# Patient Record
Sex: FEMALE | Race: WHITE | Employment: FULL TIME | URBAN - METROPOLITAN AREA
[De-identification: names, ages, dates, MRNs, and addresses within clinical notes are randomized per-mention and may not be internally consistent; named-entity substitution may affect disease eponyms.]

---

## 2017-01-17 ENCOUNTER — RADIANT APPOINTMENT (OUTPATIENT)
Dept: MAMMOGRAPHY | Facility: CLINIC | Age: 42
End: 2017-01-17
Attending: OBSTETRICS & GYNECOLOGY
Payer: COMMERCIAL

## 2017-01-17 DIAGNOSIS — Z12.31 VISIT FOR SCREENING MAMMOGRAM: ICD-10-CM

## 2017-01-17 PROCEDURE — G0202 SCR MAMMO BI INCL CAD: HCPCS | Mod: TC

## 2017-01-23 ENCOUNTER — OFFICE VISIT (OUTPATIENT)
Dept: FAMILY MEDICINE | Facility: CLINIC | Age: 42
End: 2017-01-23
Payer: COMMERCIAL

## 2017-01-23 VITALS
SYSTOLIC BLOOD PRESSURE: 118 MMHG | HEART RATE: 76 BPM | WEIGHT: 152 LBS | DIASTOLIC BLOOD PRESSURE: 66 MMHG | HEIGHT: 67 IN | TEMPERATURE: 98.2 F | BODY MASS INDEX: 23.86 KG/M2

## 2017-01-23 DIAGNOSIS — M99.04 SOMATIC DYSFUNCTION OF SACRAL REGION: ICD-10-CM

## 2017-01-23 DIAGNOSIS — M54.50 ACUTE LEFT-SIDED LOW BACK PAIN WITHOUT SCIATICA: Primary | ICD-10-CM

## 2017-01-23 DIAGNOSIS — M99.02 SOMATIC DYSFUNCTION OF THORACIC REGION: ICD-10-CM

## 2017-01-23 DIAGNOSIS — M99.03 SOMATIC DYSFUNCTION OF LUMBAR REGION: ICD-10-CM

## 2017-01-23 DIAGNOSIS — M99.05 SOMATIC DYSFUNCTION OF PELVIS REGION: ICD-10-CM

## 2017-01-23 PROCEDURE — 99213 OFFICE O/P EST LOW 20 MIN: CPT | Mod: 25 | Performed by: FAMILY MEDICINE

## 2017-01-23 PROCEDURE — 98926 OSTEOPATH MANJ 3-4 REGIONS: CPT | Performed by: FAMILY MEDICINE

## 2017-01-23 RX ORDER — CYCLOBENZAPRINE HCL 10 MG
5-10 TABLET ORAL 3 TIMES DAILY PRN
Qty: 30 TABLET | Refills: 1 | Status: SHIPPED | OUTPATIENT
Start: 2017-01-23 | End: 2022-12-05

## 2017-01-23 NOTE — PATIENT INSTRUCTIONS
Do back exercises.   Take Flexeril every night for 3-4 nights for back pain.   Follow up in 3-4 weeks for manipulation.  Find some exercise you like to do.

## 2017-01-23 NOTE — PROGRESS NOTES
"SUBJECTIVE:  Sonya Blackmon, a 41 year old female scheduled an appointment to discuss the following issues:    Lower Back Pain  Patient reports that her intermittent lower back pain turned into constant lower back pain within the last year. She is seated at work for 8 hours a day. She has not tried any muscle relaxers.     Medical, social, surgical, and family histories reviewed.    ROS:  GI: NEGATIVE for nausea, abdominal pain, heartburn, or change in bowel habits  : NEGATIVE for frequency, dysuria, or hematuria  M: NEGATIVE for significant arthralgias or myalgia  N: NEGATIVE for weakness, dizziness or paresthesias or headache  P: NEGATIVE for changes in mood or affect    This document serves as a record of the services and decisions personally performed and made by Becca Pardo DO. It was created on her/his behalf by Rupal Palomino, a trained medical scribe. The creation of this document is based the provider's statements to the medical scribe.  Rupal Palomino January 23, 2017 7:47 AM    OBJECTIVE:  /66 mmHg  Pulse 76  Temp(Src) 98.2  F (36.8  C) (Oral)  Ht 5' 6.75\" (1.695 m)  Wt 152 lb (68.947 kg)  BMI 24.00 kg/m2  LMP 01/15/2017 (Approximate)  EXAM:  GENERAL APPEARANCE: healthy, alert and no distress  MS: Paraspinal lumbar spasm on the right, pain over the lumbar-sacro junction on the left, extremities normal- no gross deformities noted, no evidence of inflammation in joints, FROM in all extremities  NEURO: Normal strength and tone, sensory exam grossly normal, mentation intact and speech normal  PSYCH: mentation appears normal and affect normal/bright    Osteopathic Structural Exam    Thoracis-- T3-T4 rotated left, T5,6,7,8 all rotated right,  Treatment HVLA, muscle energy  Lumbars--L1-2 rotated right, Treatment, ME  Pelvix-- upslip on the left and right,  Treatment muscle energy  Sacrum--unilateral forward torsion on left, treatment ME    ASSESSMENT/PLAN:    ICD-10-CM    1. Acute left-sided " low back pain without sciatica M54.5 cyclobenzaprine (FLEXERIL) 10 MG tablet   2. Somatic dysfunction of thoracic region M99.02 OSTEOPATHIC MANIP,3-4 BODY REGN   3. Somatic dysfunction of lumbar region M99.03 OSTEOPATHIC MANIP,3-4 BODY REGN   4. Somatic dysfunction of sacral region M99.04 OSTEOPATHIC MANIP,3-4 BODY REGN   5. Somatic dysfunction of pelvis region M99.05 OSTEOPATHIC MANIP,3-4 BODY REGN        Patient Instructions   Do back exercises.   Take Flexeril every night for 3-4 nights for back pain.   Follow up in 3-4 weeks for manipulation.  Find some exercise you like to do.       This document serves as a record of the services and decisions personally performed and made by Becca Pardo DO. It was created on her behalf by Rupal Palomino, a trained medical scribe. The creation of this document is based the provider's statements to the medical scribe.  Rupal Palomino January 23, 2017 7:47 AM

## 2017-01-23 NOTE — NURSING NOTE
"Chief Complaint   Patient presents with     Back Pain       Initial /66 mmHg  Pulse 76  Temp(Src) 98.2  F (36.8  C) (Oral)  Ht 5' 6.75\" (1.695 m)  Wt 152 lb (68.947 kg)  BMI 24.00 kg/m2  LMP 01/15/2017 (Approximate) Estimated body mass index is 24 kg/(m^2) as calculated from the following:    Height as of this encounter: 5' 6.75\" (1.695 m).    Weight as of this encounter: 152 lb (68.947 kg).  BP completed using cuff size: regular    Siena Ruby MA     "

## 2017-01-23 NOTE — PROGRESS NOTES
"  SUBJECTIVE:                                                    Sonya Blackmon is a 41 year old female who presents to clinic today for the following health issues:    Back Pain      Duration: about one year        Specific cause: none    Description:   Location of pain: low back left  Character of pain: sharp and dull ache  Pain radiation:radiates into the left leg  New numbness or weakness in legs, not attributed to pain:  YES    Intensity: Currently 5/10    History:   Pain interferes with job: YES  History of back problems: no prior back problems  Any previous MRI or X-rays: None  Sees a specialist for back pain:  No  Therapies tried without relief: nothing    Alleviating factors:   Improved by: ibuprofen and tylenol help temporarily      Precipitating factors:  Worsened by: Sitting, yardwork     Accompanying Signs & Symptoms:  Risk of Fracture:  None  Risk of Cauda Equina:  None  Risk of Infection:  None  Risk of Cancer:  None  Risk of Ankylosing Spondylitis:  Onset at age <35, male, AND morning back stiffness. no          Came on fast   Now more constant. Not as painful but constant. Hard to get up.  Tried chiropractic once. No physical therapy.     Tingling down back of left side. Sometimes down to toes usually above the knee.    {additional problems for provider to add:490073}    Problem list and histories reviewed & adjusted, as indicated.  Additional history: {NONE - AS DOCUMENTED:549807::\"as documented\"}    {HIST REVIEW/ LINKS 2:343060}    ROS:  {ROS COMP:153711}    OBJECTIVE:                                                    /66 mmHg  Pulse 76  Temp(Src) 98.2  F (36.8  C) (Oral)  Ht 5' 6.75\" (1.695 m)  Wt 152 lb (68.947 kg)  BMI 24.00 kg/m2  LMP 01/15/2017 (Approximate)  Body mass index is 24 kg/(m^2).  {Exam List:336026}    {Diagnostic Test Results:885808::\"Diagnostic Test Results:\",\"none \"}     ASSESSMENT/PLAN:                                                    {Associate for " Codin}    {Quality Requirements:366089}    {Diag Picklist:152474}    {Follow-Up:391906}    Becca Pardo DO  [facility]

## 2017-01-23 NOTE — MR AVS SNAPSHOT
After Visit Summary   1/23/2017    Sonya Blackmon    MRN: [id number]           Patient Information     Date Of Birth          1975        Visit Information        Provider Department      1/23/2017 7:20 AM Becca Pardo,  [facility]        Today's Diagnoses     Acute left-sided low back pain without sciatica    -  1     Somatic dysfunction of thoracic region         Somatic dysfunction of lumbar region         Somatic dysfunction of sacral region         Somatic dysfunction of pelvis region           Care Instructions    Do back exercises.   Take Flexeril every night for 3-4 nights for back pain.   Follow up in 3-4 weeks for manipulation.  Find some exercise you like to do.         Follow-ups after your visit        Your next 10 appointments already scheduled     Feb 10, 2017  8:30 AM   New Visit with Caitlyn Moon MD   [facility] ([facility]    [address] [place] [address] [phone]              Who to contact     If you have questions or need follow up information about today's clinic visit or your schedule please contact [facility] directly at [phone].  Normal or non-critical lab and imaging results will be communicated to you by [employer], letter or phone within 4 business days after the clinic has received the results. If you do not hear from us within 7 days, please contact the clinic through [employer] or phone. If you have a critical or abnormal lab result, we will notify you by phone as soon as possible.  Submit refill requests through [employer] or call your pharmacy and they will forward the refill request to us. Please allow 3 business days for your refill to be completed.          Additional Information About Your Visit        [employer] Information     [employer] lets you send messages to your doctor, view your test results, renew your prescriptions, schedule appointments and more. To  "sign up, go to [email or web address] . Click on \"Log in\" on the left side of the screen, which will take you to the Welcome page. Then click on \"Sign up Now\" on the right side of the page.     You will be asked to enter the access code listed below, as well as some personal information. Please follow the directions to create your username and password.     Your access code is: [email or web address]  Expires: 2017  8:07 AM     Your access code will  in 90 days. If you need help or a new code, please call your [place] clinic or [phone].        Care EveryWhere ID     This is your Care EveryWhere ID. This could be used by other organizations to access your [facility] medical records  [id number]        Your Vitals Were     Pulse Temperature Height BMI (Body Mass Index) Last Period       76 98.2  F (36.8  C) (Oral) 5' 6.75\" (1.695 m) 24.00 kg/m2 01/15/2017 (Approximate)        Blood Pressure from Last 3 Encounters:   17 118/66   16 104/64   01/27/15 104/52    Weight from Last 3 Encounters:   17 152 lb (68.947 kg)   16 150 lb 9.6 oz (68.312 kg)   01/27/15 152 lb 9.6 oz (69.219 kg)              We Performed the Following     OSTEOPATHIC MANIP,3-4 BODY REGN          Today's Medication Changes          These changes are accurate as of: 17  8:07 AM.  If you have any questions, ask your nurse or doctor.               Start taking these medicines.        Dose/Directions    cyclobenzaprine 10 MG tablet   Commonly known as:  FLEXERIL   Used for:  Acute left-sided low back pain without sciatica   Started by:  Becca Pardo DO        Dose:  5-10 mg   Take 0.5-1 tablets (5-10 mg) by mouth 3 times daily as needed for muscle spasms   Quantity:  30 tablet   Refills:  1            Where to get your medicines      These medications were sent to [place] [address] [place] [facility] [place] - [address]  [address], [facility] [address]     Phone:  [phone]    - cyclobenzaprine 10 MG " tablet             Primary Care Provider Office Phone # Fax #    [facility] [phone]       [address] [place] [id number]        Thank you!     Thank you for choosing [facility]  for your care. Our goal is always to provide you with excellent care. Hearing back from our patients is one way we can continue to improve our services. Please take a few minutes to complete the written survey that you may receive in the mail after your visit with us. Thank you!             Your Updated Medication List - Protect others around you: Learn how to safely use, store and throw away your medicines at www.disposemymeds.org.          This list is accurate as of: 1/23/17  8:07 AM.  Always use your most recent med list.                   Brand Name Dispense Instructions for use    ACZONE 7.5 % gel   Generic drug:  dapsone      Apply topically daily       adapalene 0.3 % gel      Apply topically At Bedtime       cyclobenzaprine 10 MG tablet    FLEXERIL    30 tablet    Take 0.5-1 tablets (5-10 mg) by mouth 3 times daily as needed for muscle spasms       DAILY MULTIVITAMIN PO      Take  by mouth.       DOXYCYCLINE HYCLATE PO      Take 50 mg by mouth 2 times daily       Sulfacetamide Sodium 10 % Liqd

## 2017-01-23 NOTE — PROGRESS NOTES
"Sonya Blackmon is a 41 year old female who presents to clinic today for the following health issues:   Back Pain    Duration: about one year  Specific cause: none. No inciting trauma  Description:   Location of pain: low back left   Character of pain: sharp and dull ache   Pain radiation:radiates into the left leg   New numbness or weakness in legs, not attributed to pain: YES   Intensity: Currently 5/10   History:   Pain interferes with job: YES   History of back problems: no prior back problems  Any previous MRI or X-rays: None   Sees a specialist for back pain: No   Therapies tried without relief: nothing   Alleviating factors:   Improved by: ibuprofen and tylenol help temporarily   Precipitating factors:  Worsened by: Sitting, yardwork  Accompanying Signs & Symptoms:  Risk of Fracture: None   Risk of Cauda Equina: None   Risk of Infection: None   Risk of Cancer: None   Risk of Ankylosing Spondylitis: Onset at age <35, male, AND morning back stiffness. no      She has been having intermittent back pain for the past year. At onset, pain episodes would come on suddenly and last for ~5 days. Now the pain is more constant and nagging. Pain is mostly exacerbated by sitting for 8 hours per day at work. Associated with paresthesias that radiate down back of left leg usually to the level of the knee but occasionally to her toes. Has been using OTC pain medication and heat at home with inconsistent relief. She has not tried muscle relaxers before. Has seen chiropractor once but no physical therapy. Denies fever, night sweats, weight loss, urinary retention, and incontinence. No history of long term corticosteroid use or fractures.  Problem list and histories reviewed & adjusted, as indicated.   ROS:   Constitutional, HEENT, cardiovascular, pulmonary, gi and gu systems are negative, except as otherwise noted.   OBJECTIVE:     /66 mmHg  Pulse 76  Temp(Src) 98.2  F (36.8  C) (Oral)  Ht 5' 6.75\" (1.695 m)  " Wt 152 lb (68.947 kg)  BMI 24.00 kg/m2  LMP 01/15/2017 (Approximate)  Body mass index is 24 kg/(m^2).   GENERAL: healthy, alert and no distress  RESP: lungs clear to auscultation - no rales, rhonchi or wheezes  CV: regular rate and rhythm, normal S1 S2, no S3 or S4, no murmur, click or rub, no peripheral edema and peripheral pulses strong  MS: FROM in all extremities. No gross deformities noted, no evidence of inflammation in joints. Flexion, extension and lateral rotation intact without pain. Mild tenderness over left L5-S1. No paraspinous muscle tenderness to palpation. Able to ambulate without pain. Negative straight leg raise test.  NEURO: normal strength and tone. Sensory exam grossly normal. Mentation intact. Normal speech. 2+ patellar and achilles reflexes bilaterally.  PSYCH: affect normal/bright  SKIN: no concerning rashes or lesions.  Diagnostic Test Results:  none       ASSESSMENT/PLAN:         ICD-10-CM    1. Acute left-sided low back pain without sciatica M54.5 cyclobenzaprine (FLEXERIL) 10 MG tablet   2. Somatic dysfunction of thoracic region M99.02 OSTEOPATHIC MANIP,3-4 BODY REGN   3. Somatic dysfunction of lumbar region M99.03 OSTEOPATHIC MANIP,3-4 BODY REGN   4. Somatic dysfunction of sacral region M99.04 OSTEOPATHIC MANIP,3-4 BODY REGN   5. Somatic dysfunction of pelvis region M99.05 OSTEOPATHIC MANIP,3-4 BODY REGN      Sonya is a 41 year old female with a one year history of left low back pain that radiates down the left leg with a grossly normal physical exam. The etiology of her pain is mostly musculoskeletal in origin. She does not have any red flag signs/symptoms to prompt me to image her back at this time. She was treated OMT today in the office and instructed to take flexeril for 3-4 nights to help with pain. Patient was also educated on back exercises for stretching and improving strength. She was encouraged to find more physical activities that she enjoys doing. Follow-up in 3-4  weeks if the pain does not resolve.    Chelita Perez, MS3

## 2017-01-24 ASSESSMENT — PATIENT HEALTH QUESTIONNAIRE - PHQ9: SUM OF ALL RESPONSES TO PHQ QUESTIONS 1-9: 1

## 2017-02-10 ENCOUNTER — OFFICE VISIT (OUTPATIENT)
Dept: ENDOCRINOLOGY | Facility: CLINIC | Age: 42
End: 2017-02-10
Payer: COMMERCIAL

## 2017-02-10 VITALS
DIASTOLIC BLOOD PRESSURE: 65 MMHG | SYSTOLIC BLOOD PRESSURE: 103 MMHG | WEIGHT: 152.6 LBS | HEART RATE: 74 BPM | BODY MASS INDEX: 24.53 KG/M2 | HEIGHT: 66 IN

## 2017-02-10 DIAGNOSIS — E28.2 PCOS (POLYCYSTIC OVARIAN SYNDROME): ICD-10-CM

## 2017-02-10 DIAGNOSIS — R68.82 DECREASED LIBIDO: Primary | ICD-10-CM

## 2017-02-10 DIAGNOSIS — R79.89 ELEVATED DEHYDROEPIANDROSTERONE SULFATE LEVEL: ICD-10-CM

## 2017-02-10 DIAGNOSIS — E04.9 GOITER: ICD-10-CM

## 2017-02-10 DIAGNOSIS — E28.8 HYPERANDROGENISM: ICD-10-CM

## 2017-02-10 LAB
DHEA-S SERPL-MCNC: 354 UG/DL (ref 35–430)
ESTRADIOL SERPL-MCNC: 99 PG/ML
FSH SERPL-ACNC: 2.2 IU/L
LH SERPL-ACNC: 3.8 IU/L
T4 FREE SERPL-MCNC: 1.06 NG/DL (ref 0.76–1.46)
TSH SERPL DL<=0.05 MIU/L-ACNC: 1.61 MU/L (ref 0.4–4)

## 2017-02-10 PROCEDURE — 82627 DEHYDROEPIANDROSTERONE: CPT | Performed by: INTERNAL MEDICINE

## 2017-02-10 PROCEDURE — 99204 OFFICE O/P NEW MOD 45 MIN: CPT | Performed by: INTERNAL MEDICINE

## 2017-02-10 PROCEDURE — 82670 ASSAY OF TOTAL ESTRADIOL: CPT | Performed by: INTERNAL MEDICINE

## 2017-02-10 PROCEDURE — 84270 ASSAY OF SEX HORMONE GLOBUL: CPT | Performed by: INTERNAL MEDICINE

## 2017-02-10 PROCEDURE — 86376 MICROSOMAL ANTIBODY EACH: CPT | Performed by: INTERNAL MEDICINE

## 2017-02-10 PROCEDURE — 99000 SPECIMEN HANDLING OFFICE-LAB: CPT | Performed by: INTERNAL MEDICINE

## 2017-02-10 PROCEDURE — 82157 ASSAY OF ANDROSTENEDIONE: CPT | Mod: 90 | Performed by: INTERNAL MEDICINE

## 2017-02-10 PROCEDURE — 83498 ASY HYDROXYPROGESTERONE 17-D: CPT | Performed by: INTERNAL MEDICINE

## 2017-02-10 PROCEDURE — 84403 ASSAY OF TOTAL TESTOSTERONE: CPT | Performed by: INTERNAL MEDICINE

## 2017-02-10 PROCEDURE — 83001 ASSAY OF GONADOTROPIN (FSH): CPT | Performed by: INTERNAL MEDICINE

## 2017-02-10 PROCEDURE — 83002 ASSAY OF GONADOTROPIN (LH): CPT | Performed by: INTERNAL MEDICINE

## 2017-02-10 PROCEDURE — 84443 ASSAY THYROID STIM HORMONE: CPT | Performed by: INTERNAL MEDICINE

## 2017-02-10 PROCEDURE — 36415 COLL VENOUS BLD VENIPUNCTURE: CPT | Performed by: INTERNAL MEDICINE

## 2017-02-10 PROCEDURE — 84439 ASSAY OF FREE THYROXINE: CPT | Performed by: INTERNAL MEDICINE

## 2017-02-10 NOTE — PROGRESS NOTES
- Endocrinology Initial Consultation -    Reason for visit/consult:  Elevated DHEAS, acnes    Primary care provider: Clinic, [facility]    HPI: 42 yo female here for elevated DHEAS, which was measured at her dermatologyg office on 11/14/2016 when she visited with worsening acnes.   She mentioned she always had acnes when she was 21, 30s, but over the past 1-2 years, it got worse. Prior to treatment at derm, she had many acnes on her cheek and chin, also chest and neck to lower back. She denied hirsutims.    Her previous menstrual cycles had been longer cycle and heavy (32 days, last 1 week). After the second delivary, she was on IUD (in 2009) and just recently took out 122/2016. Her first period after off IUD was on 1/15/2017.     She also mentioned she had goiter when she was child. She started to take medication age age 9 for 1 year when she was in [facility].     Energy level: always, Sleep: good, BW: no change, Appetite: good, +loss of libido for the past 5 years.         Past Medical/Surgical History:  Past Medical History   Diagnosis Date     NO ACTIVE PROBLEMS      Past Surgical History   Procedure Laterality Date     No history of surgery     C section 7.5 year ago  Thyroid disease age [age].   Allergies:  Allergies   Allergen Reactions     No Known Drug Allergies        Current Medications   Current Outpatient Prescriptions   Medication     DOXYCYCLINE HYCLATE PO     Sulfacetamide Sodium 10 % LIQD     adapalene 0.3 % gel     dapsone (ACZONE) 7.5 % gel     Multiple Vitamin (DAILY MULTIVITAMIN PO)     cyclobenzaprine (FLEXERIL) 10 MG tablet     No current facility-administered medications for this visit.       Family History:  Family History   Problem Relation Age of Onset     HEART DISEASE Maternal Grandfather      MI     HEART DISEASE Paternal Grandfather      MI     Hypertension Mother      CANCER Mother      Uterine     GASTROINTESTINAL DISEASE Mother       "\"bacteria in stomach\"     Thyroid Disease Mother      HEART DISEASE Mother        Social History:  Social History   Substance Use Topics     Smoking status: Never Smoker      Smokeless tobacco: Never Used     Alcohol Use: No   Lives  and two children (age 12.5, and 7.5), Job: financial    ROS:  Full review of systems taken with the help of the intake sheet. Otherwise a complete 14 point review of systems was taken and is negative unless stated in the history above.    Physical Exam:   Blood pressure 103/65, pulse 74, height 1.676 m (5' 6\"), weight 69.219 kg (152 lb 9.6 oz), last menstrual period 01/15/2017, not currently breastfeeding.    General: well appearing, no acute distress, pleasant and conversant,   Mental Status/neuro: alert and oriented  Face: Acne+ bilateral cheek to chin, no hirsutism, slight reddish facial color  Eyes: anicteric, PERRL, no proptosis or lid lag  Neck: suppler, no lymphadenopahty  Chest: acnes+  Back: acnes+  Heart: regular rhythm, S1S2, no murmur appreciated  Arms: no hair growth  Pelvic hair pattern: normal  Lung: clear to auscultation bilaterally  Abdomen: soft, NT/ND  Legs: no swelling or edema  Feet: no deformities or ulcers, 2+ DP pulses, normal monofilament sensation      Labs (General):   NA      142   4/3/2012   POTASSIUM      4.3   4/3/2012  CHLORIDE      102   4/3/2012  YELENA      9.3   4/3/2012  CO2       23   4/3/2012  BUN       12   4/3/2012  CR     0.78   4/3/2012  GLC       86   6/24/2014  TSH     1.97   6/24/2014  T4     1.24   2/27/2013  No results found for this basename: A1C      Labs (Outside derm office by [facility] on 11/14/2016):  Na: 139, K 4.2, DHEAS: 450 (), total testosterone 13        Assessment and Plan  41 year old female with worsening acnes, elevated DHEAS  # with her worsening acnes, heavy menstrual cycles, may have hyperandrogenism,  - Repeat DHEAS, total and free testosterone, SHBG, LH, FSH, Estradiol, Estriol    # history of childhood goiter, " fatigue, low libido may relate to thyroid function  - TSH, free T4, TPO antibody    - RTC with me in 4 month    I spent 45 minutes with this patient face to face and explained the conditions and plans (more than 50% of time was counseling/coordination of care) . The patient understood and is satisfied with today's visit. Return to clinic with me in 4 months.         Caitlyn Moon MD  Staff Physician  Endocrinology and Metabolism  License: [id number]

## 2017-02-10 NOTE — MR AVS SNAPSHOT
After Visit Summary   2/10/2017    Sonya Blackmon    MRN: [id number]           Patient Information     Date Of Birth          1975        Visit Information        Provider Department      2/10/2017 8:30 AM Caitlyn Moon MD [facility]         Follow-ups after your visit        Your next 10 appointments already scheduled     2017  8:00 AM   Return Visit with Caitlyn Moon MD   [facility] ([facility])    [address] [place] [address] [phone]              Who to contact     If you have questions or need follow up information about today's clinic visit or your schedule please contact [facility] directly at [phone].  Normal or non-critical lab and imaging results will be communicated to you by MyChart, letter or phone within 4 business days after the clinic has received the results. If you do not hear from us within 7 days, please contact the clinic through MyChart or phone. If you have a critical or abnormal lab result, we will notify you by phone as soon as possible.  Submit refill requests through "[employer]" or call your pharmacy and they will forward the refill request to us. Please allow 3 business days for your refill to be completed.          Additional Information About Your Visit        [employer] Information     "[employer]" is an electronic gateway that provides easy, online access to your medical records. With "[employer]", you can request a clinic appointment, read your test results, renew a prescription or communicate with your care team.     To sign up for "[employer]" visit the website at [email or web address]   You will be asked to enter the access code listed below, as well as some personal information. Please follow the directions to create your username and password.     Your access code is: [email or web address]  Expires: 2017  8:07 AM     Your access code will  in 90 days. If  "you need help or a new code, please contact your [facility] or call [phone] for assistance.        Care EveryWhere ID     This is your Care EveryWhere ID. This could be used by other organizations to access your [place] medical records  [id number]        Your Vitals Were     Pulse Height BMI (Body Mass Index) Last Period          74 1.676 m (5' 6\") 24.64 kg/m2 01/15/2017 (Approximate)         Blood Pressure from Last 3 Encounters:   02/10/17 103/65   01/23/17 118/66   11/29/16 104/64    Weight from Last 3 Encounters:   02/10/17 69.219 kg (152 lb 9.6 oz)   01/23/17 68.947 kg (152 lb)   11/29/16 68.312 kg (150 lb 9.6 oz)              Today, you had the following     No orders found for display       Primary Care Provider Office Phone # Fax #    [facility] [phone]       [facility] [facility] [address]        Thank you!     Thank you for choosing [facility]  for your care. Our goal is always to provide you with excellent care. Hearing back from our patients is one way we can continue to improve our services. Please take a few minutes to complete the written survey that you may receive in the mail after your visit with us. Thank you!             Your Updated Medication List - Protect others around you: Learn how to safely use, store and throw away your medicines at www.disposemymeds.org.          This list is accurate as of: 2/10/17  8:51 AM.  Always use your most recent med list.                   Brand Name Dispense Instructions for use    ACZONE 7.5 % gel   Generic drug:  dapsone      Apply topically daily       adapalene 0.3 % gel      Apply topically At Bedtime       cyclobenzaprine 10 MG tablet    FLEXERIL    30 tablet    Take 0.5-1 tablets (5-10 mg) by mouth 3 times daily as needed for muscle spasms       DAILY MULTIVITAMIN PO      Take  by mouth.       DOXYCYCLINE HYCLATE PO      Take 50 mg by mouth 2 times " daily       Sulfacetamide Sodium 10 % Liqd

## 2017-02-10 NOTE — LETTER
Patient:  Sonya Blackmon  :   1975  MRN:     [id number]        Ms.Katarzyna Blackmon  [address] [facility] [address]        2017    Dear ,    We are writing to inform you of your test results.  Your DHEAS was normal this time. Let us know if we can help you any.    Take care    Caitlyn Moon MD    Resulted Orders   TSH   Result Value Ref Range    TSH 1.61 0.40 - 4.00 mU/L   T4, free   Result Value Ref Range    T4 Free 1.06 0.76 - 1.46 ng/dL   Thyroid peroxidase antibody   Result Value Ref Range    Thyroid Peroxidase Antibody <10 <35 IU/mL   DHEA sulfate   Result Value Ref Range    DHEA Sulfate 354 35 - 430 ug/dL   Lutropin   Result Value Ref Range    Lutropin 3.8 IU/L      Comment:      LH Reference Range   Female: Follicular      1.9-12.5           Mid-cycle       8.7-76.3           Luteal          0.5-16.9           Postmenopausal  15.9-54.0     Follicle stimulating hormone   Result Value Ref Range    FSH 2.2 IU/L      Comment:      FSH Reference Range   Female: Follicular      2.5-10.2           Mid-cycle       3.4-33.4           Luteal          1.5-9.1           Postmenopausal  23.0-116.3     Testosterone Free and Total   Result Value Ref Range    Testosterone Total 16 8 - 60 ng/dL      Comment:      This test was developed and its performance characteristics determined by the   [facility],  Special Chemistry Laboratory. It has   not been cleared or approved by the FDA. The laboratory is regulated under   CLIA   as qualified to perform high-complexity testing. This test is used for   clinical   purposes. It should not be regarded as investigational or for research.      Sex Hormone Binding Globulin 40 30 - 135 nmol/L    Free Testosterone Calculated 0.25 0.13 - 0.92 ng/dL      Comment:      18-30 Years 0.08-0.74 ng/dL   31-40 Years 0.13-0.92 ng/dL   41-51 Years 0.11-0.58 ng/dL   Postmenopausal 0.06-0.38 ng/dL     Estradiol    Result Value Ref Range    Estradiol 99 pg/mL      Comment:      Estradiol reference ranges for pre-menopausal   Follicular     pg/mL   Mid-cycle    pg/mL   Luteal          pg/mL     Estriol   Result Value Ref Range    Estriol 0.04       Comment:      Unit: ng/mL  (Note)  INTERPRETIVE INFORMATION: Estriol, Serum (based on  gestational                                     age)  25 weeks ........................ 1.9 - 6.7  ng/mL  26 weeks ........................ 2.0 - 7.3  ng/mL  27 - 29 weeks ................... 2.1 - 9.1  ng/mL  30 - 31 weeks ................... 2.4 - 10.6 ng/mL  32 - 37 weeks ................... 2.6 - 16.7 ng/mL  Nonpregnant Female .......... Less than 0.08 ng/mL  Male: ....................... Less than 0.16 ng/mL  Access complete set of age- and/or gender-specific  reference intervals for this test in the [email or web address]  Test Directory ([email or web address].com).  Performed by [facility],  [address] [place] [id number] [phone]  [email or web address], Sandor Jones MD, Lab. Director         Caitlyn Moon MD

## 2017-02-10 NOTE — LETTER
2/10/2017       RE: Sonya Blackmon  [address] [facility] [address]     Dear Colleague,    Thank you for referring your patient, Sonya Blackmon, to the [facility] CLINICS at [facility]. Please see a copy of my visit note below.                                            - Endocrinology Initial Consultation -    Reason for visit/consult:  Elevated DHEAS, acnes    Primary care provider: Clinic, [facility]    HPI: 40 yo female here for elevated DHEAS, which was measured at her dermatologyg office on 11/14/2016 when she visited with worsening acnes.   She mentioned she always had acnes when she was 21, 30s, but over the past 1-2 years, it got worse. Prior to treatment at derm, she had many acnes on her cheek and chin, also chest and neck to lower back. She denied hirsutims.    Her previous menstrual cycles had been longer cycle and heavy (32 days, last 1 week). After the second delivary, she was on IUD (in 2009) and just recently took out 122/2016. Her first period after off IUD was on 1/15/2017.     She also mentioned she had goiter when she was child. She started to take medication age age 9 for 1 year when she was in [facility].     Energy level: always, Sleep: good, BW: no change, Appetite: good, +loss of libido for the past 5 years.         Past Medical/Surgical History:  Past Medical History   Diagnosis Date     NO ACTIVE PROBLEMS      Past Surgical History   Procedure Laterality Date     No history of surgery     C section 7.5 year ago  Thyroid disease age [age].   Allergies:  Allergies   Allergen Reactions     No Known Drug Allergies        Current Medications   Current Outpatient Prescriptions   Medication     DOXYCYCLINE HYCLATE PO     Sulfacetamide Sodium 10 % LIQD     adapalene 0.3 % gel     dapsone (ACZONE) 7.5 % gel     Multiple Vitamin (DAILY MULTIVITAMIN PO)     cyclobenzaprine (FLEXERIL) 10 MG tablet     No current  "facility-administered medications for this visit.       Family History:  Family History   Problem Relation Age of Onset     HEART DISEASE Maternal Grandfather      MI     HEART DISEASE Paternal Grandfather      MI     Hypertension Mother      CANCER Mother      Uterine     GASTROINTESTINAL DISEASE Mother      \"bacteria in stomach\"     Thyroid Disease Mother      HEART DISEASE Mother        Social History:  Social History   Substance Use Topics     Smoking status: Never Smoker      Smokeless tobacco: Never Used     Alcohol Use: No   Lives  and two children (age 12.5, and 7.5), Job: financial    ROS:  Full review of systems taken with the help of the intake sheet. Otherwise a complete 14 point review of systems was taken and is negative unless stated in the history above.    Physical Exam:   Blood pressure 103/65, pulse 74, height 1.676 m (5' 6\"), weight 69.219 kg (152 lb 9.6 oz), last menstrual period 01/15/2017, not currently breastfeeding.    General: well appearing, no acute distress, pleasant and conversant,   Mental Status/neuro: alert and oriented  Face: Acne+ bilateral cheek to chin, no hirsutism, slight reddish facial color  Eyes: anicteric, PERRL, no proptosis or lid lag  Neck: suppler, no lymphadenopahty  Chest: acnes+  Back: acnes+  Heart: regular rhythm, S1S2, no murmur appreciated  Arms: no hair growth  Pelvic hair pattern: normal  Lung: clear to auscultation bilaterally  Abdomen: soft, NT/ND  Legs: no swelling or edema  Feet: no deformities or ulcers, 2+ DP pulses, normal monofilament sensation      Labs (General):   NA      142   4/3/2012   POTASSIUM      4.3   4/3/2012  CHLORIDE      102   4/3/2012  YELENA      9.3   4/3/2012  CO2       23   4/3/2012  BUN       12   4/3/2012  CR     0.78   4/3/2012  GLC       86   6/24/2014  TSH     1.97   6/24/2014  T4     1.24   2/27/2013  No results found for this basename: A1C      Labs (Outside derm office by Month[facility] on 11/14/2016):  Na: 139, K 4.2, DHEAS: 450 " (), total testosterone 13        Assessment and Plan  41 year old female with worsening acnes, elevated DHEAS  # with her worsening acnes, heavy menstrual cycles, may have hyperandrogenism,  - Repeat DHEAS, total and free testosterone, SHBG, LH, FSH, Estradiol, Estriol    # history of childhood goiter, fatigue, low libido may relate to thyroid function  - TSH, free T4, TPO antibody    - RTC with me in 4 month    I spent 45 minutes with this patient face to face and explained the conditions and plans (more than 50% of time was counseling/coordination of care) . The patient understood and is satisfied with today's visit. Return to clinic with me in 4 months.         Caitlyn Moon MD  Staff Physician  Endocrinology and Metabolism  License: [id number]      Again, thank you for allowing me to participate in the care of your patient.      Sincerely,    Caitlyn Moon MD

## 2017-02-11 LAB
ESTRIOL SERPL-MCNC: 0.04 NG/ML
SHBG SERPL-SCNC: 40 NMOL/L (ref 30–135)
TESTOST FREE SERPL-MCNC: 0.25 NG/DL (ref 0.13–0.92)
TESTOST SERPL-MCNC: 16 NG/DL (ref 8–60)

## 2017-02-13 LAB
ANDROST SERPL-MCNC: 1.21 NG/ML
THYROPEROXIDASE AB SERPL-ACNC: <10 IU/ML

## 2017-02-16 LAB — 17OHP SERPL-MCNC: 169 NG/DL

## 2017-02-24 ENCOUNTER — TELEPHONE (OUTPATIENT)
Dept: ENDOCRINOLOGY | Facility: CLINIC | Age: 42
End: 2017-02-24

## 2017-02-24 DIAGNOSIS — E28.8 HYPERANDROGENISM: Primary | ICD-10-CM

## 2017-02-24 NOTE — TELEPHONE ENCOUNTER
Ref. Range 2/10/2017 08:56   17-OH Progesterone Latest Units: ng/dL 169   Androstenedione Unknown 1.210 (H)   DHEA Sulfate Latest Ref Range: 35 - 430 ug/dL 354   Estriol Unknown 0.04   Estradiol Latest Units: pg/mL 99   FSH Latest Units: IU/L 2.2   T4 Free Latest Ref Range: 0.76 - 1.46 ng/dL 1.06   TSH Latest Ref Range: 0.40 - 4.00 mU/L 1.61   Lutropin Latest Units: IU/L 3.8   Sex Hormone Binding Globulin Latest Ref Range: 30 - 135 nmol/L 40   Testosterone Total Latest Ref Range: 8 - 60 ng/dL 16   Free Testosterone Calculated Latest Ref Range: 0.13 - 0.92 ng/dL 0.25   Thyroid Peroxidase Antibody Latest Ref Range: <35 IU/mL <10     She has one time elevated DHEAS (450- upper limit 266 at Derm office), and one time elevated androstenedione. To rule out adrenal pathology, will check 17 OH progesterone.     I called the patient and left the message.    Caitlyn Moon MD  Staff Physician  Endocrinology and Metabolism  [facility]  License: [id number]  Pager: [phone]

## 2017-03-02 DIAGNOSIS — E28.8 HYPERANDROGENISM: ICD-10-CM

## 2017-03-02 PROCEDURE — 83498 ASY HYDROXYPROGESTERONE 17-D: CPT | Performed by: INTERNAL MEDICINE

## 2017-03-02 PROCEDURE — 36415 COLL VENOUS BLD VENIPUNCTURE: CPT | Performed by: INTERNAL MEDICINE

## 2017-03-09 LAB — 17OHP SERPL-MCNC: 102 NG/DL

## 2017-04-28 ENCOUNTER — TELEPHONE (OUTPATIENT)
Dept: ENDOCRINOLOGY | Facility: CLINIC | Age: 42
End: 2017-04-28

## 2017-04-28 NOTE — TELEPHONE ENCOUNTER
[facility] Call Center    Phone Message    Name of Caller: Sonya    Phone Number: Cell number on file:    Telephone Information:   Mobile [phone]       Best time to return call: any    May a detailed message be left on voicemail: yes    Relation to patient: Self    Reason for Call: Other: she would like [facility] lab results mailed to her.     [address] [facility] [address]    Action Taken: Message routed to:  Adult Clinics: Endocrinology p [phone]

## 2017-04-28 NOTE — TELEPHONE ENCOUNTER
Patient requesting 3/2 lab result is faxed to her at [phone].    Copy of lab results faxed to patient per her request.    Fior Castillo LPN  Adult Endocrinology  [facility]

## 2018-07-09 ENCOUNTER — TELEPHONE (OUTPATIENT)
Dept: FAMILY MEDICINE | Facility: CLINIC | Age: 43
End: 2018-07-09

## 2018-07-09 NOTE — TELEPHONE ENCOUNTER
Reason for Call:  Same Day Appointment, Requested Provider:  Becca Pardo DO    PCP: Clinic, [facility]    Reason for visit: Back pain    Duration of symptoms: 2-3 days    Have you been treated for this in the past? Yes    Additional comments: Tara, patient's sister, called and stated patient is currently on a plane from [place] and will arrive home around noon. Tara stated patient asked her to call and try to get her in with Dr. Pardo for her back pain. She thinks she may have thrown her back out. Patient told Tara that this has happened before and she saw Dr. Pardo for this in January 2017. Tara asked if Dr. Pardo could see patient anytime today after 1 pm or anytime tomorrow. Please call back to discuss.    Can we leave a detailed message on this number? YES    Phone number patient can be reached at: Tara: [phone]    Best Time: Anytime    Call taken on 7/9/2018 at 7:52 AM by Karrie Cordero

## 2018-07-09 NOTE — TELEPHONE ENCOUNTER
"Spoke with Tara, patient's sister.  She states patient is en route home from [facility]. Something happened to patient's back in [place], where she collapsed and could not move well.  She was seen in a hospital there and received morphine.  She is not able to take more than a few steps at a time. Per x-ray, spine is fine, and she has feeling in toes, etc, but is still having pain.  Assisted with making appointment with Dr. Pardo for tomorrow morning as requested and instructed that if patient should have symptoms such as progressive weakness, numbness/tingling, loss of bowel/bladder control, dizziness, or difficulty moving legs/feet/toes, then she should be seen in the emergency department.  She voices understanding.    Instructions adapted from \"Telephone Triage Protocols for Nurses\" Fifth Edition by Gay Booker \"Back Pain\"    Carmenza Summers RN      "

## 2018-07-10 ENCOUNTER — OFFICE VISIT (OUTPATIENT)
Dept: FAMILY MEDICINE | Facility: CLINIC | Age: 43
End: 2018-07-10
Payer: COMMERCIAL

## 2018-07-10 VITALS
DIASTOLIC BLOOD PRESSURE: 70 MMHG | SYSTOLIC BLOOD PRESSURE: 98 MMHG | HEIGHT: 66 IN | TEMPERATURE: 98.1 F | HEART RATE: 64 BPM | WEIGHT: 152 LBS | BODY MASS INDEX: 24.43 KG/M2

## 2018-07-10 DIAGNOSIS — M99.04 SOMATIC DYSFUNCTION OF SACRAL REGION: ICD-10-CM

## 2018-07-10 DIAGNOSIS — M99.05 SOMATIC DYSFUNCTION OF PELVIS REGION: ICD-10-CM

## 2018-07-10 DIAGNOSIS — M99.02 SOMATIC DYSFUNCTION OF THORACIC REGION: ICD-10-CM

## 2018-07-10 DIAGNOSIS — M54.50 ACUTE BILATERAL LOW BACK PAIN WITHOUT SCIATICA: Primary | ICD-10-CM

## 2018-07-10 PROCEDURE — 98926 OSTEOPATH MANJ 3-4 REGIONS: CPT | Performed by: FAMILY MEDICINE

## 2018-07-10 PROCEDURE — 99213 OFFICE O/P EST LOW 20 MIN: CPT | Mod: 25 | Performed by: FAMILY MEDICINE

## 2018-07-10 RX ORDER — MORPHINE SULFATE 10 MG/1
10 CAPSULE, EXTENDED RELEASE ORAL EVERY 4 HOURS PRN
COMMUNITY
End: 2018-07-24

## 2018-07-10 RX ORDER — CELECOXIB 200 MG/1
200 CAPSULE ORAL 2 TIMES DAILY
COMMUNITY
End: 2022-12-05

## 2018-07-10 NOTE — MR AVS SNAPSHOT
"              After Visit Summary   7/10/2018    Sonya Blackmon    MRN: [id number]           Patient Information     Date Of Birth          1975        Visit Information        Provider Department      7/10/2018 11:40 AM Becca Pardo DO [facility]        Today's Diagnoses     Acute bilateral low back pain without sciatica    -  1    Somatic dysfunction of thoracic region        Somatic dysfunction of sacral region        Somatic dysfunction of pelvis region          Care Instructions        Passive spinal twist both sides          Figure four stretch       Do these daily and follow up in two weeks if not improved     Becca Pardo D.OMelinda              Follow-ups after your visit        Who to contact     If you have questions or need follow up information about today's clinic visit or your schedule please contact [facility] directly at [phone].  Normal or non-critical lab and imaging results will be communicated to you by MyChart, letter or phone within 4 business days after the clinic has received the results. If you do not hear from us within 7 days, please contact the clinic through MyChart or phone. If you have a critical or abnormal lab result, we will notify you by phone as soon as possible.  Submit refill requests through [employer] or call your pharmacy and they will forward the refill request to us. Please allow 3 business days for your refill to be completed.          Additional Information About Your Visit        MyChart Information     [employer] lets you send messages to your doctor, view your test results, renew your prescriptions, schedule appointments and more. To sign up, go to [email or web address] . Click on \"Log in\" on the left side of the screen, which will take you to the Welcome page. Then click on \"Sign up Now\" on the right side of the page.     You will be asked to enter the access code listed below, as well as some personal information. " "Please follow the directions to create your username and password.     Your access code is: [email or web address]  Expires: 10/8/2018 11:38 AM     Your access code will  in 90 days. If you need help or a new code, please call your [facility] clinic or [phone].        Care EveryWhere ID     This is your Care EveryWhere ID. This could be used by other organizations to access your [facility] medical records  [id number]        Your Vitals Were     Pulse Temperature Height BMI (Body Mass Index)          64 98.1  F (36.7  C) (Oral) 5' 6\" (1.676 m) 24.53 kg/m2         Blood Pressure from Last 3 Encounters:   07/10/18 98/70   02/10/17 103/65   17 118/66    Weight from Last 3 Encounters:   07/10/18 152 lb (68.9 kg)   02/10/17 152 lb 9.6 oz (69.2 kg)   17 152 lb (68.9 kg)              We Performed the Following     OSTEOPATHIC MANIP,3-4 BODY REGN       Information about OPIOIDS     PRESCRIPTION OPIOIDS: WHAT YOU NEED TO KNOW   We gave you an opioid (narcotic) pain medicine. It is important to manage your pain, but opioids are not always the best choice. You should first try all the other options your care team gave you. Take this medicine for as short a time (and as few doses) as possible.     These medicines have risks:    DO NOT drive when on new or higher doses of pain medicine. These medicines can affect your alertness and reaction times, and you could be arrested for driving under the influence (DUI). If you need to use opioids long-term, talk to your care team about driving.    DO NOT operate heave machinery    DO NOT do any other dangerous activities while taking these medicines.     DO NOT drink any alcohol while taking these medicines.      If the opioid prescribed includes acetaminophen, DO NOT take with any other medicines that contain acetaminophen. Read all labels carefully. Look for the word  acetaminophen  or  Tylenol.  Ask your pharmacist if you have questions or are unsure.    You can get addicted " to pain medicines, especially if you have a history of addiction (chemical, alcohol or substance dependence). Talk to your care team about ways to reduce this risk.    Store your pills in a secure place, locked if possible. We will not replace any lost or stolen medicine. If you don t finish your medicine, please throw away (dispose) as directed by your pharmacist. The [place] Pollution Control Agency has more information about safe disposal: https://www.pca.[facility].[place].us/living-green/managing-unwanted-medications.     All opioids tend to cause constipation. Drink plenty of water and eat foods that have a lot of fiber, such as fruits, vegetables, prune juice, apple juice and high-fiber cereal. Take a laxative (Miralax, milk of magnesia, Colace, Senna) if you don t move your bowels at least every other day.          Primary Care Provider Office Phone # Fax #    Becca NelsonDO soy [phone]       [address] [address]        Equal Access to Services     ERIK ANDUJAR : Hadii loren ku hadasho Soomaali, waaxda luqadaha, qaybta kaalmada adeegyada, waxay ger palma . So [facility] [phone].    ATENCIÓN: Si habla español, tiene a rivera disposición servicios gratuitos de asistencia lingüística. Llame al [phone].    We comply with applicable federal civil rights laws and [place] laws. We do not discriminate on the basis of race, color, national origin, age, disability, sex, sexual orientation, or gender identity.            Thank you!     Thank you for choosing [facility]  for your care. Our goal is always to provide you with excellent care. Hearing back from our patients is one way we can continue to improve our services. Please take a few minutes to complete the written survey that you may receive in the mail after your visit with us. Thank you!             Your Updated Medication List - Protect others around you: Learn how to safely use, store and  throw away your medicines at www.disposemymeds.org.          This list is accurate as of 7/10/18 12:35 PM.  Always use your most recent med list.                   Brand Name Dispense Instructions for use Diagnosis    ACZONE 7.5 % gel   Generic drug:  dapsone      Apply topically daily    Encounter for gynecological examination without abnormal finding       adapalene 0.3 % gel      Apply topically At Bedtime    Encounter for gynecological examination without abnormal finding       celeBREX 200 MG capsule   Generic drug:  celecoxib      Take 200 mg by mouth 2 times daily        cyclobenzaprine 10 MG tablet    FLEXERIL    30 tablet    Take 0.5-1 tablets (5-10 mg) by mouth 3 times daily as needed for muscle spasms    Acute left-sided low back pain without sciatica       DAILY MULTIVITAMIN PO      Take  by mouth.        DOXYCYCLINE HYCLATE PO      Take 50 mg by mouth 2 times daily    Encounter for gynecological examination without abnormal finding       morphine sulfate 10 MG 24 hr capsule    ANANDA     Take 10 mg by mouth every 4 hours as needed for moderate pain        Sulfacetamide Sodium 10 % Liqd       Encounter for gynecological examination without abnormal finding       traMADol-acetaminophen 37.5-325 MG per tablet    ULTRACET     Take 1 tablet by mouth every 6 hours as needed for severe pain        UNABLE TO FIND      MEDICATION NAME: Paramed 500mg.  Take 2 tablet every 4 hours when pain is above a 3 or running a fever        UNABLE TO FIND      MEDICATION NAME: Myonal 50mg.  Take 1 tablet 3 times daily in morning, noon, and evening after meals

## 2018-07-10 NOTE — PATIENT INSTRUCTIONS
Passive spinal twist both sides          Figure four stretch       Do these daily and follow up in two weeks if not improved     Becca Pardo D.O.

## 2018-07-24 ENCOUNTER — OFFICE VISIT (OUTPATIENT)
Dept: FAMILY MEDICINE | Facility: CLINIC | Age: 43
End: 2018-07-24
Payer: COMMERCIAL

## 2018-07-24 VITALS
SYSTOLIC BLOOD PRESSURE: 106 MMHG | DIASTOLIC BLOOD PRESSURE: 68 MMHG | HEART RATE: 72 BPM | WEIGHT: 153.5 LBS | BODY MASS INDEX: 24.67 KG/M2 | TEMPERATURE: 98.7 F | HEIGHT: 66 IN

## 2018-07-24 DIAGNOSIS — M99.05 SOMATIC DYSFUNCTION OF PELVIS REGION: ICD-10-CM

## 2018-07-24 DIAGNOSIS — M99.02 SOMATIC DYSFUNCTION OF THORACIC REGION: ICD-10-CM

## 2018-07-24 DIAGNOSIS — M99.03 SOMATIC DYSFUNCTION OF LUMBAR REGION: ICD-10-CM

## 2018-07-24 DIAGNOSIS — M54.50 ACUTE LEFT-SIDED LOW BACK PAIN WITHOUT SCIATICA: Primary | ICD-10-CM

## 2018-07-24 PROCEDURE — 98926 OSTEOPATH MANJ 3-4 REGIONS: CPT | Performed by: FAMILY MEDICINE

## 2018-07-24 PROCEDURE — 99212 OFFICE O/P EST SF 10 MIN: CPT | Mod: 25 | Performed by: FAMILY MEDICINE

## 2018-07-24 ASSESSMENT — ANXIETY QUESTIONNAIRES
2. NOT BEING ABLE TO STOP OR CONTROL WORRYING: NOT AT ALL
1. FEELING NERVOUS, ANXIOUS, OR ON EDGE: NOT AT ALL
3. WORRYING TOO MUCH ABOUT DIFFERENT THINGS: NOT AT ALL
6. BECOMING EASILY ANNOYED OR IRRITABLE: NOT AT ALL
5. BEING SO RESTLESS THAT IT IS HARD TO SIT STILL: NOT AT ALL
7. FEELING AFRAID AS IF SOMETHING AWFUL MIGHT HAPPEN: NOT AT ALL
GAD7 TOTAL SCORE: 0

## 2018-07-24 ASSESSMENT — PAIN SCALES - GENERAL: PAINLEVEL: NO PAIN (1)

## 2018-07-24 ASSESSMENT — PATIENT HEALTH QUESTIONNAIRE - PHQ9: 5. POOR APPETITE OR OVEREATING: NOT AT ALL

## 2018-07-24 NOTE — PROGRESS NOTES
"SUBJECTIVE:  Sonya Blackmon, a 42 year old female scheduled an appointment to discuss the following issues:     Acute left-sided low back pain without sciatica  Somatic dysfunction of thoracic region  Somatic dysfunction of lumbar region  Somatic dysfunction of pelvis region     She had recently traveled to [facility] and had severe back pain at the end of the trip.  She was carrying a lot of heavy luggage.  She was seen several times in clinics in [facility] and given medications.  She was seen here on 7/10/18 and had OMT and was given home exercises.  She is 75% better.  She is doing the exercises.  The pain is still in both hips and left lower back.      Medical, social, surgical, and family histories reviewed.    ROS:  GI: NEGATIVE for nausea, abdominal pain, heartburn, or change in bowel habits  : NEGATIVE for frequency, dysuria, or hematuria  M: NEGATIVE for significant arthralgias or myalgia  N: NEGATIVE for weakness, dizziness or paresthesias or headache  P: NEGATIVE for changes in mood or affect    OBJECTIVE:  /68 (BP Location: Right arm, Cuff Size: Adult Regular)  Pulse 72  Temp 98.7  F (37.1  C) (Oral)  Ht 5' 6\" (1.676 m)  Wt 153 lb 8 oz (69.6 kg)  BMI 24.78 kg/m2  EXAM:  GENERAL APPEARANCE: healthy, alert and no distress  MS: extremities normal- no gross deformities noted, no evidence of inflammation in joints, FROM in all extremities.  SKIN: no suspicious lesions or rashes  NEURO: Normal strength and tone, sensory exam grossly normal, mentation intact and speech normal  PSYCH: mentation appears normal and affect normal/bright    Osteopathic Structural Exam    Thoracis--T 4-7 rotated right, Treatment HVLA  Lumbars--L 5 rotated left,Treatment ME  Innominates--upslip on left,Treatment ME    ASSESSMENT/PLAN:    ICD-10-CM    1. Acute left-sided low back pain without sciatica M54.5 [facility], AND CHIROPRACTIC REFERRAL   2. Somatic dysfunction of thoracic region M99.02 OSTEOPATHIC MANIP,3-4 " BODY REGN   3. Somatic dysfunction of lumbar region M99.03 OSTEOPATHIC MANIP,3-4 BODY REGN   4. Somatic dysfunction of pelvis region M99.05 OSTEOPATHIC MANIP,3-4 BODY REGN     She will look into PT and continue home exercises.  She is much improved by OMT but would like to strengthen her back to prevent future problems     Becca Pardo D.O.

## 2018-07-24 NOTE — MR AVS SNAPSHOT
After Visit Summary   7/24/2018    Sonay Blackmon    MRN: [id number]           Patient Information     Date Of Birth          1975        Visit Information        Provider Department      7/24/2018 11:20 AM Becca Pardo DO [facility]        Today's Diagnoses     Acute left-sided low back pain without sciatica    -  1    Somatic dysfunction of thoracic region        Somatic dysfunction of lumbar region        Somatic dysfunction of pelvis region           Follow-ups after your visit        Additional Services     ALEXANDRA PT, HAND, AND CHIROPRACTIC REFERRAL       **This order will print in the [facility] Scheduling Office**    Physical Therapy, Hand Therapy and Chiropractic Care are available through:    *[place] [facility]  *[facility]  *[facility]    Call one number to schedule at any of the above locations: [phone].    Your provider has referred you to: Physical Therapy at [facility] or [facility]    Indication/Reason for Referral: Low Back Pain  Onset of Illness: recurrent   Therapy Orders: Evaluate and Treat  Special Programs: None  Special Request: None    Sandhya Arthur      Additional Comments for the Therapist or Chiropractor:     Please be aware that coverage of these services is subject to the terms and limitations of your health insurance plan.  Call member services at your health plan with any benefit or coverage questions.      Please bring the following to your appointment:    *Your personal calendar for scheduling future appointments  *Comfortable clothing                  Who to contact     If you have questions or need follow up information about today's clinic visit or your schedule please contact [facility] directly at [phone].  Normal or non-critical lab and imaging results will be communicated to you by MyChart, letter or phone within 4 business days after the clinic has received the results. If  "you do not hear from us within 7 days, please contact the clinic through [employer] or phone. If you have a critical or abnormal lab result, we will notify you by phone as soon as possible.  Submit refill requests through [employer] or call your pharmacy and they will forward the refill request to us. Please allow 3 business days for your refill to be completed.          Additional Information About Your Visit        [employer] [employer] Information     [employer] lets you send messages to your doctor, view your test results, renew your prescriptions, schedule appointments and more. To sign up, go to [email or web address] . Click on \"Log in\" on the left side of the screen, which will take you to the Welcome page. Then click on \"Sign up Now\" on the right side of the page.     You will be asked to enter the access code listed below, as well as some personal information. Please follow the directions to create your username and password.     Your access code is: [email or web address]  Expires: 10/8/2018 11:38 AM     Your access code will  in 90 days. If you need help or a new code, please call your [place] clinic or [phone].        Care EveryWhere ID     This is your Care EveryWhere ID. This could be used by other organizations to access your [place] medical records  [id number]        Your Vitals Were     Pulse Temperature Height BMI (Body Mass Index)          72 98.7  F (37.1  C) (Oral) 5' 6\" (1.676 m) 24.78 kg/m2         Blood Pressure from Last 3 Encounters:   18 106/68   07/10/18 98/70   02/10/17 103/65    Weight from Last 3 Encounters:   18 153 lb 8 oz (69.6 kg)   07/10/18 152 lb (68.9 kg)   02/10/17 152 lb 9.6 oz (69.2 kg)              We Performed the Following     ALEXANDRA PT, HAND, AND CHIROPRACTIC REFERRAL     OSTEOPATHIC MANIP,3-4 BODY REGN        Primary Care Provider Office Phone # Fax #    Becca Nelsonsoy  [phone] [address] [facility] [address]        Equal Access to Services  "    [facility]: Hadii aad ku zohra Pineda, waaxda luqadaha, qaybta kaalmada adejonatan, halle ger hayzachary abrahamwengualberto palma . So [facility] [phone].    ATENCIÓN: Si habla español, tiene a rivera disposición servicios gratuitos de asistencia lingüística. Llame al [phone].    We comply with applicable federal civil rights laws and [place] laws. We do not discriminate on the basis of race, color, national origin, age, disability, sex, sexual orientation, or gender identity.            Thank you!     Thank you for choosing [facility]  for your care. Our goal is always to provide you with excellent care. Hearing back from our patients is one way we can continue to improve our services. Please take a few minutes to complete the written survey that you may receive in the mail after your visit with us. Thank you!             Your Updated Medication List - Protect others around you: Learn how to safely use, store and throw away your medicines at www.disposemymeds.org.          This list is accurate as of 7/24/18 12:03 PM.  Always use your most recent med list.                   Brand Name Dispense Instructions for use Diagnosis    ACZONE 7.5 % gel   Generic drug:  dapsone      Apply topically daily    Encounter for gynecological examination without abnormal finding       adapalene 0.3 % gel      Apply topically At Bedtime    Encounter for gynecological examination without abnormal finding       celeBREX 200 MG capsule   Generic drug:  celecoxib      Take 200 mg by mouth 2 times daily        cyclobenzaprine 10 MG tablet    FLEXERIL    30 tablet    Take 0.5-1 tablets (5-10 mg) by mouth 3 times daily as needed for muscle spasms    Acute left-sided low back pain without sciatica       DAILY MULTIVITAMIN PO      Take  by mouth.        DOXYCYCLINE HYCLATE PO      Take 50 mg by mouth 2 times daily    Encounter for gynecological examination without abnormal finding       Sulfacetamide Sodium 10 % Liqd        Encounter for gynecological examination without abnormal finding

## 2018-07-25 ASSESSMENT — PATIENT HEALTH QUESTIONNAIRE - PHQ9: SUM OF ALL RESPONSES TO PHQ QUESTIONS 1-9: 0

## 2018-07-25 ASSESSMENT — ANXIETY QUESTIONNAIRES: GAD7 TOTAL SCORE: 0

## 2018-08-06 ENCOUNTER — THERAPY VISIT (OUTPATIENT)
Dept: PHYSICAL THERAPY | Facility: CLINIC | Age: 43
End: 2018-08-06
Payer: COMMERCIAL

## 2018-08-06 DIAGNOSIS — S33.8XXD SPRAIN OF PELVIS, SUBSEQUENT ENCOUNTER: ICD-10-CM

## 2018-08-06 DIAGNOSIS — M54.50 LUMBAGO: Primary | ICD-10-CM

## 2018-08-06 PROCEDURE — 97161 PT EVAL LOW COMPLEX 20 MIN: CPT | Mod: GP | Performed by: PHYSICAL THERAPIST

## 2018-08-06 PROCEDURE — 97110 THERAPEUTIC EXERCISES: CPT | Mod: GP | Performed by: PHYSICAL THERAPIST

## 2018-08-06 PROCEDURE — 97530 THERAPEUTIC ACTIVITIES: CPT | Mod: GP | Performed by: PHYSICAL THERAPIST

## 2018-08-06 NOTE — PROGRESS NOTES
[place] [facility] Initial Evaluation    Physical Therapy Initial Examination/Evaluation    August 6, 2018    Sonya Blackmon  is a 42 year old  female referred to physical therapy by Dr. Pardo for treatment of LBP.      DOI/onset MD order 7/24/2018  Mechanism of injury I took a shower and bent forward to  my clothes.  The MD felt the sacrum was dislocated creating a spasm and onset of symptoms.  This happened 7/4/2018.  I have had episodes of this severe low back pain over the last 2 years.    DOS none  Prior treatment injections, 2 shots in leg, hospitalization with Rx Manipulation. Effect of prior treatment fair    Chief Complaint:   LBP.   Pain location: across the lower back, left hand side and into the leg.  Lateral side of the hip  Quality: sharp, shooting and terrible with spasm.  Numbness into the leg.  Constant/Intermittent: intermittent  Symptoms have worsened since onset.    Current pain 3/10.  Pain at best 0/10.  Pain at worst 3/10.    Symptoms aggravated by prolonged sitting (30 min) with attempt to walk.    Symptoms improved with lying down- morning after sleeping is the best.     Social history:  Pt is , 2 children (13 and 8 years old).  Pt walks for exercise and MD Rx- stretching.  Limited amount as she did not want to get sore.    Occupation: Para planner/assistant.  Job duties:  Sitting; computer and phone work.    Patient having difficulty with ADLs: sitting, avoiding lifting.    Patient's goals are improve pain.    Patient reports general health as good.  Related medical history depression.    Surgical History:  None reported.       Outcome measure:   Oswestry/Sandhya  Return to MD:  As needed.      Clinical Impression: Marybeth presents to [facility] with primary complaint of lower back pain.  Per clinical examination, pt demonstrates lack of lumbar segmental mobility with 3/5 (+) left sacroiliac joint tests present.  Pt with  positive response to lumbar mobilization in clinic today with trial of extension based exercise for home.  To follow up on stabilization with focus on home program for long term management of function.  See plan of care outlined below.    HPI                    Objective:  Observation:   - Decreased lumbar lordosis, slight anterior pelvic tilt.  Skin crease evident L5.    - Equal illiac crest, GT.    - R sacral sulcus deep     Single leg stance balance:   - Compensated trendelenburg L         System         Lumbar/SI Evaluation  ROM:    AROM Lumbar:   Flexion:            75% pulling with pain down L hamstring   Ext:                    25% with repeated moition no worsening of sx    Side Bend:        Left:  WFL CL flank pain     Right:  WFL CL flank pain   Rotation:           Left:  75%    Right:  50% pain thoracic  Side Glide:        Left:     Right:         Strength: Lower abdominal fair/poor  Lumbar Myotomes:  normal (hip ER 5/5 )            Lumbar DTR's:  not assessed        Lumbar Dermtomes:  normal                Neural Tension/Mobility:      Left side:SLR or Slump  negative.     Right side:   Slump or SLR  negative.       Lumbar Provocation:  Lumbar provocation: (-) CARLA R, (+) CARLA L for LBP, (+) FADIR for hip pain B,  Left positive with:  PROM hip    Right negative with:  PROM hip  Spinal Segmental Conclusions:         Level: Hypo note at L5  SI joint/Sacrum:        Left positive at:    Ilium Ventromedial and Thigh thrust  Left negative at:    Squish    Sacral conclusion left:  Anterior inominate                                           FRSR L5  General     ROS    Assessment/Plan:    Patient is a 42 year old female with lumbar complaints.    Patient has the following significant findings with corresponding treatment plan.                Diagnosis 1:  LBP    Pain -  hot/cold therapy, US, manual therapy, self management and education  Decreased ROM/flexibility - manual therapy and therapeutic  exercise  Decreased joint mobility - manual therapy and therapeutic exercise  Decreased strength - therapeutic exercise and therapeutic activities  Impaired muscle performance - neuro re-education  Decreased function - therapeutic activities  Impaired posture - neuro re-education     Therapy Evaluation Codes:   1) History comprised of:   Personal factors that impact the plan of care:      Time since onset of symptoms.    Comorbidity factors that impact the plan of care are:      Depression.     Medications impacting care: None.  2) Examination of Body Systems comprised of:   Body structures and functions that impact the plan of care:      Lumbar spine and Pelvis.   Activity limitations that impact the plan of care are:      Bathing, Bending, Dressing, Lifting, Sitting, Squatting/kneeling and Standing.  3) Clinical presentation characteristics are:   Stable/Uncomplicated.  4) Decision-Making    Low complexity using standardized patient assessment instrument and/or measureable assessment of functional outcome.  Cumulative Therapy Evaluation is: Low complexity.    Previous and current functional limitations:  (See Goal Flow Sheet for this information)    Short term and Long term goals: (See Goal Flow Sheet for this information)     Communication ability:  Patient appears to be able to clearly communicate and understand verbal and written communication and follow directions correctly.  Treatment Explanation - The following has been discussed with the patient:   RX ordered/plan of care  Anticipated outcomes  Possible risks and side effects  This patient would benefit from PT intervention to resume normal activities.   Rehab potential is good.    Frequency:  1 X week, once daily  Duration:  for 4 weeks, tapering to 2-3x/month for 2 months. 10 visits total  Discharge Plan:  Achieve all LTG.  Independent in home treatment program.  Reach maximal therapeutic benefit.    Please refer to the daily flowsheet for treatment  today, total treatment time and time spent performing 1:1 timed codes.

## 2018-08-06 NOTE — MR AVS SNAPSHOT
After Visit Summary   8/6/2018    Sonya Blackmon    MRN: [id number]           Patient Information     Date Of Birth          1975        Visit Information        Provider Department      8/6/2018 2:40 PM Maggie Garcia, PT [facility]        Today's Diagnoses     Lumbago    -  1    Sprain of pelvis, subsequent encounter           Follow-ups after your visit        Your next 10 appointments already scheduled     Aug 16, 2018  7:30 AM CDT   ALEXANDRA Spine with Siomara Rodriguez PT   [facility] Physical Therapy ([facility]  )    [address] [place] [id number] [phone]            Aug 23, 2018  7:30 AM CDT   ALEXANDRA Spine with Maggie Garcia PT   [facility] Physical Therapy ([facility]  )    [address] [place] [id number] [phone]            Aug 30, 2018  7:30 AM CDT   ALEXANDRA Spine with Siomara Rodriguez PT   [facility] Physical Therapy ([facility]  )    [address] [place] [id number] [phone]            Sep 06, 2018  7:30 AM CDT   ALEXANDRA Spine with Maggie Garcia, PT   [facility] ([address] [phone]              Who to contact     If you have questions or need follow up information about today's clinic visit or your schedule please contact [facility] PHYSICAL THERAPY directly at [phone].  Normal or non-critical lab and imaging results will be communicated to you by MyChart, letter or phone within 4 business days after the clinic has received the results. If you do not hear from us within 7 days, please contact the clinic through MyChart or phone. If you have a critical or abnormal lab result, we will notify you by phone as soon as  "possible.  Submit refill requests through [employer] or call your pharmacy and they will forward the refill request to us. Please allow 3 business days for your refill to be completed.          Additional Information About Your Visit        [employer] Information     [employer] lets you send messages to your doctor, view your test results, renew your prescriptions, schedule appointments and more. To sign up, go to [email or web address] . Click on \"Log in\" on the left side of the screen, which will take you to the Welcome page. Then click on \"Sign up Now\" on the right side of the page.     You will be asked to enter the access code listed below, as well as some personal information. Please follow the directions to create your username and password.     Your access code is: [email or web address]  Expires: 10/8/2018 11:38 AM     Your access code will  in 90 days. If you need help or a new code, please call your [place] clinic or [phone].        Care EveryWhere ID     This is your Care EveryWhere ID. This could be used by other organizations to access your [place] medical records  [id number]         Blood Pressure from Last 3 Encounters:   18 106/68   07/10/18 98/70   02/10/17 103/65    Weight from Last 3 Encounters:   18 69.6 kg (153 lb 8 oz)   07/10/18 68.9 kg (152 lb)   02/10/17 69.2 kg (152 lb 9.6 oz)              We Performed the Following     ALEXANDRA Inital Eval Report     PT Eval, Low Complexity (84569)     Therapeutic Activities     Therapeutic Exercises        Primary Care Provider Office Phone # Fax #    Becca DO Char [phone]       [facility] [address]        Equal Access to Services     ERIK ANDUJAR : Brijesh Pineda, ketan fenton, [place] lomelialhalle ahumada [facility] [phone].    ATENCIÓN: Si habla español, tiene a rivera disposición servicios gratuitos de asistencia lingüística. Llame al " [phone].    We comply with applicable federal civil rights laws and [place] laws. We do not discriminate on the basis of race, color, national origin, age, disability, sex, sexual orientation, or gender identity.            Thank you!     Thank you for choosing [place] [facility] PHYSICAL THERAPY  for your care. Our goal is always to provide you with excellent care. Hearing back from our patients is one way we can continue to improve our services. Please take a few minutes to complete the written survey that you may receive in the mail after your visit with us. Thank you!             Your Updated Medication List - Protect others around you: Learn how to safely use, store and throw away your medicines at www.disposemymeds.org.          This list is accurate as of 8/6/18 10:49 PM.  Always use your most recent med list.                   Brand Name Dispense Instructions for use Diagnosis    ACZONE 7.5 % gel   Generic drug:  dapsone      Apply topically daily    Encounter for gynecological examination without abnormal finding       adapalene 0.3 % gel      Apply topically At Bedtime    Encounter for gynecological examination without abnormal finding       celeBREX 200 MG capsule   Generic drug:  celecoxib      Take 200 mg by mouth 2 times daily        cyclobenzaprine 10 MG tablet    FLEXERIL    30 tablet    Take 0.5-1 tablets (5-10 mg) by mouth 3 times daily as needed for muscle spasms    Acute left-sided low back pain without sciatica       DAILY MULTIVITAMIN PO      Take  by mouth.        DOXYCYCLINE HYCLATE PO      Take 50 mg by mouth 2 times daily    Encounter for gynecological examination without abnormal finding       Sulfacetamide Sodium 10 % Liqd       Encounter for gynecological examination without abnormal finding

## 2018-08-16 ENCOUNTER — THERAPY VISIT (OUTPATIENT)
Dept: PHYSICAL THERAPY | Facility: CLINIC | Age: 43
End: 2018-08-16
Payer: COMMERCIAL

## 2018-08-16 DIAGNOSIS — M54.50 ACUTE LEFT-SIDED LOW BACK PAIN WITHOUT SCIATICA: ICD-10-CM

## 2018-08-16 DIAGNOSIS — S33.8XXD SPRAIN OF PELVIS, SUBSEQUENT ENCOUNTER: ICD-10-CM

## 2018-08-16 PROCEDURE — 97140 MANUAL THERAPY 1/> REGIONS: CPT | Mod: GP | Performed by: PHYSICAL THERAPIST

## 2018-08-16 PROCEDURE — 97110 THERAPEUTIC EXERCISES: CPT | Mod: GP | Performed by: PHYSICAL THERAPIST

## 2018-08-16 NOTE — PROGRESS NOTES
Subjective:  HPI                    Objective:  System    Physical Exam    General     ROS    Assessment/Plan:    SUBJECTIVE  Subjective changes as noted by pt:   Pt. reports slight decrease in LBP this week. The pain is now more localized to the L SI area. She no longer has pain into the L thigh.   Current pain level:  2/10   Changes in function:  Yes (See Goal flowsheet attached for changes in current functional level)     Adverse reaction to treatment or activity:  None    OBJECTIVE  Changes in objective findings:  Posture - ant rotation of L ileum. ROM lumbar flex 75%; ext normal; R Rot 75%.     ASSESSMENT  Sonya continues to require intervention to meet STG and LTG's: PT  Patient is progressing as expected.  Response to therapy has shown an improvement in  pain level and ROM   Progress made towards STG/LTG?  Yes (See Goal flowsheet attached for updates on achievement of STG and LTG)    PLAN  Current treatment program is being advanced to more complex exercises.    PTA/ATC plan:  N/A    Please refer to the daily flowsheet for treatment today, total treatment time and time spent performing 1:1 timed codes.

## 2018-08-16 NOTE — MR AVS SNAPSHOT
After Visit Summary   8/16/2018    Sonya Blackmon    MRN: [id number]           Patient Information     Date Of Birth          1975        Visit Information        Provider Department      8/16/2018 7:30 AM Siomara Rodriguez, PT [facility] Physical Therapy        Today's Diagnoses     Acute left-sided low back pain without sciatica        Sprain of pelvis, subsequent encounter           Follow-ups after your visit        Your next 10 appointments already scheduled     Aug 23, 2018  7:30 AM CDT   ALEXANDRA Spine with Maggie Garcia, PT   [facility] Physical Therapy ([facility]  )    [address] [place] [id number]   [phone]            Aug 30, 2018  7:30 AM CDT   ALEXANDRA Spine with Ovidio Donohue, PT   [facility] Physical Therapy ([facility]  )    [address] [place] [id number] [phone]            Sep 06, 2018  7:30 AM CDT   ALEXANDRA Spine with Maggie Garcia, PT   [facility] Physical Therapy ([facility]  )    [address] [place] [id number] [phone]              Who to contact     If you have questions or need follow up information about today's clinic visit or your schedule please contact [facility] PHYSICAL THERAPY directly at [phone].  Normal or non-critical lab and imaging results will be communicated to you by MyChart, letter or phone within 4 business days after the clinic has received the results. If you do not hear from us within 7 days, please contact the clinic through MyChart or phone. If you have a critical or abnormal lab result, we will notify you by phone as soon as possible.  Submit refill requests through [employer] or call your pharmacy and they will forward the refill request to us. Please allow 3 business days for your refill to be completed.           Additional Information About Your Visit        Care EveryWhere ID     This is your Care EveryWhere ID. This could be used by other organizations to access your [place] medical records  [id number]         Blood Pressure from Last 3 Encounters:   07/24/18 106/68   07/10/18 98/70   02/10/17 103/65    Weight from Last 3 Encounters:   07/24/18 69.6 kg (153 lb 8 oz)   07/10/18 68.9 kg (152 lb)   02/10/17 69.2 kg (152 lb 9.6 oz)              We Performed the Following     Manual Ther Tech, 1+Regions, EA 15 min     Therapeutic Exercises        Primary Care Provider Office Phone # Fax #    Becca Pardo [phone]       [address] [facility] [address]        Equal Access to Services     ERIK ANDUJAR : Hadii loren servin hadasho Soomaali, waaxda luqadaha, qaybta kaalmada adeegyada, halle cyr hayzachary palma . So [facility] [phone].    ATENCIÓN: Si habla español, tiene a rivera disposición servicios gratuitos de asistencia lingüística. Llame al [phone].    We comply with applicable federal civil rights laws and [place] laws. We do not discriminate on the basis of race, color, national origin, age, disability, sex, sexual orientation, or gender identity.            Thank you!     Thank you for choosing [facility] PHYSICAL THERAP[facility]  for your care. Our goal is always to provide you with excellent care. Hearing back from our patients is one way we can continue to improve our services. Please take a few minutes to complete the written survey that you may receive in the mail after your visit with us. Thank you!             Your Updated Medication List - Protect others around you: Learn how to safely use, store and throw away your medicines at www.disposemymeds.org.          This list is accurate as of 8/16/18  8:48 AM.  Always use your most recent med list.                   Brand Name Dispense Instructions for use Diagnosis    ACZONE 7.5 % gel   Generic  drug:  dapsone      Apply topically daily    Encounter for gynecological examination without abnormal finding       adapalene 0.3 % gel      Apply topically At Bedtime    Encounter for gynecological examination without abnormal finding       celeBREX 200 MG capsule   Generic drug:  celecoxib      Take 200 mg by mouth 2 times daily        cyclobenzaprine 10 MG tablet    FLEXERIL    30 tablet    Take 0.5-1 tablets (5-10 mg) by mouth 3 times daily as needed for muscle spasms    Acute left-sided low back pain without sciatica       DAILY MULTIVITAMIN PO      Take  by mouth.        DOXYCYCLINE HYCLATE PO      Take 50 mg by mouth 2 times daily    Encounter for gynecological examination without abnormal finding       Sulfacetamide Sodium 10 % Liqd       Encounter for gynecological examination without abnormal finding

## 2018-08-30 ENCOUNTER — THERAPY VISIT (OUTPATIENT)
Dept: PHYSICAL THERAPY | Facility: CLINIC | Age: 43
End: 2018-08-30
Payer: COMMERCIAL

## 2018-08-30 DIAGNOSIS — S33.8XXD SPRAIN OF PELVIS, SUBSEQUENT ENCOUNTER: ICD-10-CM

## 2018-08-30 DIAGNOSIS — M54.50 ACUTE LEFT-SIDED LOW BACK PAIN WITHOUT SCIATICA: ICD-10-CM

## 2018-08-30 PROCEDURE — 97530 THERAPEUTIC ACTIVITIES: CPT | Mod: GP | Performed by: PHYSICAL THERAPIST

## 2018-08-30 PROCEDURE — 97110 THERAPEUTIC EXERCISES: CPT | Mod: GP | Performed by: PHYSICAL THERAPIST

## 2018-08-30 NOTE — PROGRESS NOTES
Subjective:  HPI                    Objective:  System    Physical Exam    General     ROS    Assessment/Plan:    SUBJECTIVE  Subjective changes as noted by pt:   Pt. was sore for 1 week after last visit but now the past week she notes mod progress with decreasing LBP and improved walking.   Current pain level:  2/10   Changes in function:  Yes (See Goal flowsheet attached for changes in current functional level)     Adverse reaction to treatment or activity:  None    OBJECTIVE  Changes in objective findings:  Amb - no limp noted on L anymore. Posture - no ant L ileum. Pelvis is aligned symmetrically. ROM lumbar flex 90%; R Rot 90%.     ASSESSMENT  Sonya continues to require intervention to meet STG and LTG's: PT  Patient is progressing as expected.  Response to therapy has shown an improvement in  pain level and ROM   Progress made towards STG/LTG?  Yes (See Goal flowsheet attached for updates on achievement of STG and LTG)    PLAN  Current treatment program is being advanced to more complex exercises.    PTA/ATC plan:  N/A    Please refer to the daily flowsheet for treatment today, total treatment time and time spent performing 1:1 timed codes.

## 2018-08-30 NOTE — MR AVS SNAPSHOT
After Visit Summary   8/30/2018    Sonya Blackmon    MRN: [id number]           Patient Information     Date Of Birth          1975        Visit Information        Provider Department      8/30/2018 7:30 AM Siomara Rodriguez, PT [facility] Physical Therapy        Today's Diagnoses     Acute left-sided low back pain without sciatica        Sprain of pelvis, subsequent encounter           Follow-ups after your visit        Your next 10 appointments already scheduled     Sep 20, 2018  7:30 AM CDT   [facility] with Maggie Garcia PT   [facility] Physical Therapy ([facility]  )    [address] [place] [id number] [phone]              Who to contact     If you have questions or need follow up information about today's clinic visit or your schedule please contact [facility] PHYSICAL THERAPY directly at [phone].  Normal or non-critical lab and imaging results will be communicated to you by MyChart, letter or phone within 4 business days after the clinic has received the results. If you do not hear from us within 7 days, please contact the clinic through MyChart or phone. If you have a critical or abnormal lab result, we will notify you by phone as soon as possible.  Submit refill requests through [employer] or call your pharmacy and they will forward the refill request to us. Please allow 3 business days for your refill to be completed.          Additional Information About Your Visit        Care EveryWhere ID     This is your Care EveryWhere ID. This could be used by other organizations to access your [place] medical records  [id number]         Blood Pressure from Last 3 Encounters:   07/24/18 106/68   07/10/18 98/70   02/10/17 103/65    Weight from Last 3 Encounters:   07/24/18 69.6 kg (153 lb 8 oz)   07/10/18 68.9 kg (152 lb)   02/10/17 69.2 kg (152 lb 9.6 oz)               We Performed the Following     Therapeutic Activities     Therapeutic Exercises        Primary Care Provider Office Phone # Fax #    Becca Pardo [phone]       [facility] [address]        Equal Access to Services     ERIK ANDUJAR : Hadii aad ku hadsergoteresita Sobrentonali, waaxda luqadaha, qaybta kaalmada adeegyada, halle banerjeealeyda magdalenolily marin minerva luciano. So [facility] [phone].    ATENCIÓN: Si habla español, tiene a rivera disposición servicios gratuitos de asistencia lingüística. Llame al [phone].    We comply with applicable federal civil rights laws and [place] laws. We do not discriminate on the basis of race, color, national origin, age, disability, sex, sexual orientation, or gender identity.            Thank you!     Thank you for choosing [place] [facility] PHYSICAL THERAPY  for your care. Our goal is always to provide you with excellent care. Hearing back from our patients is one way we can continue to improve our services. Please take a few minutes to complete the written survey that you may receive in the mail after your visit with us. Thank you!             Your Updated Medication List - Protect others around you: Learn how to safely use, store and throw away your medicines at www.disposemymeds.org.          This list is accurate as of 8/30/18  8:53 AM.  Always use your most recent med list.                   Brand Name Dispense Instructions for use Diagnosis    ACZONE 7.5 % gel   Generic drug:  dapsone      Apply topically daily    Encounter for gynecological examination without abnormal finding       adapalene 0.3 % gel      Apply topically At Bedtime    Encounter for gynecological examination without abnormal finding       celeBREX 200 MG capsule   Generic drug:  celecoxib      Take 200 mg by mouth 2 times daily        cyclobenzaprine 10 MG tablet    FLEXERIL    30 tablet    Take 0.5-1 tablets (5-10 mg) by mouth 3 times daily as  needed for muscle spasms    Acute left-sided low back pain without sciatica       DAILY MULTIVITAMIN PO      Take  by mouth.        DOXYCYCLINE HYCLATE PO      Take 50 mg by mouth 2 times daily    Encounter for gynecological examination without abnormal finding       Sulfacetamide Sodium 10 % Liqd       Encounter for gynecological examination without abnormal finding

## 2018-09-20 ENCOUNTER — THERAPY VISIT (OUTPATIENT)
Dept: PHYSICAL THERAPY | Facility: CLINIC | Age: 43
End: 2018-09-20
Payer: COMMERCIAL

## 2018-09-20 DIAGNOSIS — M54.50 ACUTE LEFT-SIDED LOW BACK PAIN WITHOUT SCIATICA: ICD-10-CM

## 2018-09-20 DIAGNOSIS — S33.8XXD SPRAIN OF PELVIS, SUBSEQUENT ENCOUNTER: ICD-10-CM

## 2018-09-20 PROCEDURE — 97110 THERAPEUTIC EXERCISES: CPT | Mod: GP | Performed by: PHYSICAL THERAPIST

## 2018-09-20 PROCEDURE — 97140 MANUAL THERAPY 1/> REGIONS: CPT | Mod: GP | Performed by: PHYSICAL THERAPIST

## 2018-09-20 PROCEDURE — 97530 THERAPEUTIC ACTIVITIES: CPT | Mod: GP | Performed by: PHYSICAL THERAPIST

## 2020-06-04 ENCOUNTER — VIRTUAL VISIT (OUTPATIENT)
Dept: FAMILY MEDICINE | Facility: OTHER | Age: 45
End: 2020-06-04

## 2020-06-04 DIAGNOSIS — Z20.822 SUSPECTED COVID-19 VIRUS INFECTION: Primary | ICD-10-CM

## 2020-06-04 PROCEDURE — 99207 ZZC NO BILLABLE SERVICE THIS VISIT: CPT

## 2020-06-04 PROCEDURE — 99000 SPECIMEN HANDLING OFFICE-LAB: CPT | Performed by: FAMILY MEDICINE

## 2020-06-04 PROCEDURE — 87635 SARS-COV-2 COVID-19 AMP PRB: CPT | Mod: 90 | Performed by: FAMILY MEDICINE

## 2020-06-04 NOTE — LETTER
June 6, 2020        Sonya Gonzaleswoodyconner  [address] [facility] [address]    This letter provides a written record that you were tested for COVID-19 on 06/04/20.     Your result was positive.     This means that we found the virus that causes COVID-19 in your sample.    How can I protect others?    For safety, it s very important to follow these rules.    First, stay home and away from others (self-isolate) until:      You ve had no fever--and no medicine that reduces fever--for 3 full days (72 hours). And      Your other symptoms have gotten better. For example, your cough or breathing has improved. And     At least 10 days have passed since your symptoms started.    During this time:    Stay in your own room (and use your own bathroom), if you can.    Stay away from others in your home. No hugging, kissing or shaking hands.    Don t let anyone visit.    Don t go to work, school or anywhere else.     Clean  high touch  surfaces often (doorknobs, counters, handles, etc.). Use a household cleaning spray or wipes.    Cover your mouth and nose with a mask, tissue or washcloth to avoid spreading germs.    Wash your hands and face often with soap and water.    You should not go back to work until you meet the guidelines above for ending your home isolation. You should meet these along with any other guidelines that your employer has.    Employers: This document serves as formal notice of your employee s medical guidelines for going back to work. They must meet the above guidelines before going back to work in person.    How can I take care of myself?    1. Get lots of rest. Drink extra fluids (unless a doctor has told you not to).      2. Take Tylenol (acetaminophen) for fever or pain. If you have liver or kidney problems, ask your family doctor if it s okay to take Tylenol.     Take either:     650 mg (two 325 mg pills) every 4 to 6 hours, or     1,000 mg (two 500 mg pills) every 8 hours as needed.     Note:  Don t take more than 3,000 mg in one day. Acetaminophen is found in many medicines (both prescribed and over-the-counter medicines). Read all labels to be sure you don t take too much.    For children, check the Tylenol bottle for the right dose. The dose is based on the child s age or weight.    3. If you have other health problems (like cancer, heart failure, an organ transplant or severe kidney disease): Call your specialty clinic if you don t feel better in the next 2 days.    4. Know when to call 911: If your breathing is so bad that it keeps you from doing normal activities, call 911 or go to the emergency room. Tell them that you ve been staying home and may have COVID-19.    5. Sign up for [employer]. We know it s scary to hear that you have COVID-19. We want to track your symptoms to make sure you re okay over the next 2 weeks. Please look for an email from [employer]--this is a free, online program that we ll use to keep in touch. To sign up, follow the link in the email. Learn more at [email or web address].    6. Interested is participating in research? Visit the link below to view current clinical trials that apply to your situation:  [email or web address]      Where can I get more information?    To learn the [facility] guidelines for staying home, please visit the [facility] of Health website at [email or web address]/[email or web address].    To learn more about COVID-19 and how to care for yourself at home, please visit the CDC website at https://www.cdc.gov/coronavirus/2019-ncov/about/steps-when-sick.html.    For more options for care at [facility], please visit our website at [email or web address]

## 2020-06-04 NOTE — PROGRESS NOTES
"Date: 2020 07:59:38  Clinician: Marva Donovan  Clinician NPI: [id number]  Patient: Sonya Blackmon  Patient : 1975  Patient Address: [address] [place] [address]  Patient Phone: [phone]  Visit Protocol: URI  Patient Summary:  Sonya is a 44 year old ( : 1975 ) female who initiated a Visit for COVID-19 (Coronavirus) evaluation and screening. When asked the question \"Please sign me up to receive news, health information and promotions from [employer].\", Sonya responded \"No\".    Sonya states her symptoms started gradually 7-9 days ago. After her symptoms started, they improved and then got worse again.   Her symptoms consist of malaise, myalgia, a cough, a headache, and chills. She is experiencing mild difficulty breathing with activities but can speak normally in full sentences. Sonya also feels feverish.   Symptom details     Cough: Sonya coughs almost every minute and her cough is not more bothersome at night. Phlegm does not come into her throat when she coughs. She does not believe her cough is caused by post-nasal drip.     Temperature: Her current temperature is 101.1 degrees Fahrenheit. Sonya has had a temperature over 100 degrees Fahrenheit for 1-2 days.     Headache: She states the headache is moderate (4-6 on a 10 point pain scale).      Sonya denies having wheezing, nausea, teeth pain, ageusia, diarrhea, sore throat, enlarged lymph nodes, anosmia, facial pain or pressure, nasal congestion, vomiting, rhinitis, and ear pain. She also denies having recent facial or sinus surgery in the past 60 days and having a sinus infection within the past year.   Precipitating events  She has not recently been exposed to someone with influenza. Sonya has been in close contact with the following high risk individuals: people with asthma, heart disease or diabetes.   Pertinent COVID-19 (Coronavirus) information  In the past 14 days, Sonya has " not worked in a congregate living setting.   She does not work or volunteer as healthcare worker or a  and does not work or volunteer in a healthcare facility.   Sonya also has not lived in a congregate living setting in the past 14 days. She does not live with a healthcare worker.   Sonya has not had a close contact with a laboratory-confirmed COVID-19 patient within 14 days of symptom onset.   Pertinent medical history  Sonya has taken an antibiotic medication in the past month. Antibiotic details as reported by the patient (free text): Cefuroxime 250MG   Sonya does not get yeast infections when she takes antibiotics.   Sonya needs a return to work/school note.   Weight: 160 lbs   Sonya does not smoke or use smokeless tobacco.   She denies pregnancy and denies breastfeeding. She has menstruated in the past month.   Weight: 160 lbs    MEDICATIONS: No current medications, ALLERGIES: NKDA  Clinician Response:  Dear Sonya,      Your symptoms show that you may have coronavirus (COVID-19). This illness can cause fever, cough and trouble breathing. Many people get a mild case and get better on their own. Some people can get very sick.  What should I do?  We would like to test you for this virus. This will be a curbside test done outside the clinic.  Please call [phone] to schedule your visit. Explain that [facility]. Be ready to share your [facility] visit ID number.  Starting now:  Stay at least 6 feet away from others. (If someone will drive you to your test, stay in the backseat, as far away from the  as you can.)   Don't go to work, school or anywhere else. When it's time for your test, go straight to the testing site. Don't make any stops on the way there or back.   Wash your hands and face often. Use soap and water.   Cover your mouth and nose with a mask, tissue or washcloth.   Don't touch anyone. No hugging, kissing or  "handshakes.  While at home   Stay home and away from others (self-isolate) until:  You've had no fever---and no medicine that reduces fever---for 3 full days (72 hours). And...  Your other symptoms have gotten better. For example, your cough or breathing has improved. And...  At least 10 days have passed since your symptoms started.  During this time:  Stay in your own room (and use your own bathroom), if you can.  Don't go to work, school or anywhere else.  Stay away from others in your home. No hugging, kissing or shaking hands.  Don't let anyone visit.  Cover your mouth and nose with a mask, tissue or washcloth to avoid spreading germs.  Clean \"high touch\" surfaces often (doorknobs, counters, handles, etc.). Use a household cleaning spray or wipes.  Wash your hands and face often. Use soap and water.  How can I take care of myself?  1. Get lots of rest. Drink extra fluids (unless your doctor has told you not to).  2. Take Tylenol (acetaminophen) for fever or pain. If you have liver or kidney problems, ask your family doctor if it's okay to take Tylenol.  Adults can take either:   650 mg (two 325 mg pills) every 4 to 6 hours, or...  1,000 mg (two 500 mg pills) every 8 hours as needed.   Note: Don't take more than 3,000 mg in one day.   Acetaminophen is found in many medicines (both prescribed and over-the-counter medicines). Read all labels to be sure you don't take too much.   For children, check the Tylenol bottle for the right dose. The dose is based on the child's age or weight.  3. If you have other health problems (like cancer, heart failure, an organ transplant or severe kidney disease): Call your specialty clinic if you don't feel better in the next 2 days.  4. Know when to call 911: If your breathing is so bad that it keeps you from doing normal activities, call 911 or go to the emergency room. Tell them that you've been staying home and may have COVID-19.  5. Sign up for [facility]. We know it's scary " to hear that you might have COVID-19. We want to track your symptoms to make sure you're okay over the next 2 weeks. Please look for an email from [employer]---this is a free, online program that we'll use to keep in touch. To sign up, follow the link in the email. Learn more at [email or web address].  6. The following will serve as your written order for this Covid Test ordered by me for the indication of suspected Covid [Z20.828]: The test will be ordered in [employer], our electronic health record after you are scheduled and will show as ordered and authorized by Keith Brown MD   Order: Covid-19 (Coronavirus) PCR for SYMPTOMATIC testing from [facility]  Where can I get more information?  To learn more about COVID-19 and how to care for yourself at home, please visit the CDC website at https://www.cdc.gov/coronavirus/2019-ncov/about/steps-when-sick.html.  For more about your care at [facility], please visit [email or web address]  If you'd like to be part of a COVID-19 clinical trial (research study) at the [facility], go to [email or web address] for details.    Diagnosis: Cough  Diagnosis ICD: R05  Additional Clinician Notes: Before returning to work, you must be fever free for 3 days and have an improvement in symptoms. Contact your Employee Health Department for your company's return to work guidelines.

## 2020-06-05 ENCOUNTER — TELEPHONE (OUTPATIENT)
Dept: NURSING | Facility: CLINIC | Age: 45
End: 2020-06-05

## 2020-06-05 LAB
SARS-COV-2 RNA SPEC QL NAA+PROBE: ABNORMAL
SPECIMEN SOURCE: ABNORMAL

## 2020-06-06 NOTE — TELEPHONE ENCOUNTER
"Coronavirus (COVID-19) Notification    Patient  Sonya Blackmon     Reason for call  Notify of Positive Coronavirus (COVID-19) lab results, assess symptoms,  review [facility] recommendations    Lab Result    Lab test:  2019-nCoV rRt-PCR or SARS-CoV-2 PCR    Oropharyngeal AND/OR nasopharyngeal swabs is POSITIVE for 2019-nCoV RNA/SARS-COV-2 PCR (COVID-19 virus)    RN Recommendations/Instructions per [facility] Coronavirus COVID-19 recommendations    Brief introduction script  Hi, My name is GLNEIS Crouch and I am calling on behalf of [employer].  We were notified that your Coronavirus test (COVID-19) for was POSITIVE for the virus.  I have some information to relay to you but first I wanted to mention that the [place] Dept of Health will be contacting you shortly [it's possible MD already called Patient] to talk to you more about how you are feeling and other people you have had contact with who might now also have the virus.  Also, [facility] is Partnering with the [facility] for Covid-19 research, you may be contacted directly by research staff.    Assessment (Inquire about Patient's current symptoms)  \"Feeling pretty ok.\"  Mainly cough and chest congestion with some chest discomfort (states , \"I just feel myself breathing, not really painful\") Feels SOB with activity but recovers quickly at rest.  Symptoms started 1 week+ prior to testing.    If at time of call, Patients symptoms hare worsened, the Patient should contact 911 or have someone drive them to Emergency Dept promptly:      If Patient calling 911, inform 911 personal that you have tested positive for the Coronavirus (COVID-19).  Place mask on and await 911 to arrive.    If Emergency Dept, If possible, please have another adult drive you to the Emergency Dept but you need to wear mask when in contact with other people.      Review information with Patient    Your result was positive. This means you have COVID-19 " (coronavirus).  We have sent you a letter that reviews the information that I'll be reviewing with you now.    How can I protect others?    If you have symptoms: stay home and away from others (self-isolate) until:    You've had no fever--and no medicine that reduces fever--for 3 full days (72 hours). And      Your other symptoms have gotten better. For example, your cough or breathing has improved. And     At least 10 days have passed since your symptoms started.    If you don't have symptoms: Stay home and away from others (self-isolate) until at least 10 days have passed since your first positive COVID-19 test. (Date test collected)    During this time:    Stay in your own room, including for meals. Use your own bathroom if you can.    Stay away from others in your home. No hugging, kissing or shaking hands. No visitors.     Don't go to work, school or anywhere else.      Clean  high touch  surfaces often (doorknobs, counters, handles, etc.). Use a household cleaning spray or wipes. You'll find a full list on the EPA website at www.epa.gov/pesticide-registration/list-n-disinfectants-use-against-sars-cov-2.     Cover your mouth and nose with a mask, tissue or washcloth to avoid spreading germs.    Wash your hands and face often with soap and water.    Caregivers in these groups are at risk for severe illness due to COVID-19:  o People 65 years and older  o People who live in a nursing home or long-term care facility  o People with chronic disease (lung, heart, cancer, diabetes, kidney, liver, immunologic)  o People who have a weakened immune system, including those who:  - Are in cancer treatment  - Take medicine that weakens the immune system, such as corticosteroids  - Had a bone marrow or organ transplant  - Have an immune deficiency  - Have poorly controlled HIV or AIDS  - Are obese (body mass index of 40 or higher)  - Smoke regularly    Caregivers should wear gloves while washing dishes, handling laundry and  cleaning bedrooms and bathrooms.    Wash and dry laundry with special caution. Don't shake dirty laundry, and use the warmest water setting you can.    If you have a weakened immune system, ask your doctor about other actions you should take.    For more tips, go to www.cdc.gov/coronavirus/2019-ncov/downloads/10Things.pdf.    You should not go back to work until you meet the guidelines above for ending your home isolation. You should meet these along with any other guidelines that your employer has.    Employers: This document serves as formal notice of your employee's medical guidelines for going back to work. They must meet the above guidelines before going back to work in person.    How can I take care of myself?    1. Get lots of rest. Drink extra fluids (unless a doctor has told you not to).    2. Take Tylenol (acetaminophen) for fever or pain. If you have liver or kidney problems, ask your family doctor if it's okay to take Tylenol.     Take either:     650 mg (two 325 mg pills) every 4 to 6 hours, or     1,000 mg (two 500 mg pills) every 8 hours as needed.     Note: Don't take more than 3,000 mg in one day. Acetaminophen is found in many medicines (both prescribed and over-the-counter medicines). Read all labels to be sure you don't take too much.    For children, check the Tylenol bottle for the right dose (based on their age or weight).    3. If you have other health problems (like cancer, heart failure, an organ transplant or severe kidney disease): Call your specialty clinic if you don't feel better in the next 2 days.    4. Know when to call 911: Emergency warning signs include:    Trouble breathing or shortness of breath    Pain or pressure in the chest that doesn't go away    Feeling confused like you haven't felt before, or not being able to wake up    Bluish-colored lips or face    5. Sign up for [employer]. We know it's scary to hear that you have COVID-19. We want to track your symptoms to make  sure you're okay over the next 2 weeks. Please look for an email from [employer]--this is a free, online program that we'll use to keep in touch. To sign up, follow the link in the email. Learn more at [email or web address].    Where can I get more information?    [facility]: [email or web address]    Coronavirus Basics: www.health.[email or web address].[place]./diseases/coronavirus/basics.html    What to Do If You're Sick: www.cdc.gov/coronavirus/2019-ncov/about/steps-when-sick.html    Ending Home Isolation: www.cdc.gov/coronavirus/2019-ncov/hcp/disposition-in-home-patients.html     Caring for Someone with COVID-19: www.cdc.gov/coronavirus/2019-ncov/if-you-are-sick/care-for-someone.html     [facility] clinical trials (COVID-19 research studies): [email or web address]-[email or web address]     Positive COVID-19 letter sent (Yes/No):  Yes    [Name]  KARON DíazN, RN

## 2020-06-06 NOTE — RESULT ENCOUNTER NOTE
Coronavirus (COVID-19) Notification     Patient has tested Positive for COVID-19 virus   COVID-19 Positive follow up letter sent   See telephone encounter

## 2020-06-06 NOTE — TELEPHONE ENCOUNTER
Attempted to call the patient x2. No answer. Left a voicemail to call back phone number [phone] between hours of 10 am-6:30 pm on the weekend.     Will route message to ED lab team to follow up with the patient tomorrow morning.     Azalea Winn, RN/[facility]

## 2020-11-05 ENCOUNTER — TRANSFERRED RECORDS (OUTPATIENT)
Dept: HEALTH INFORMATION MANAGEMENT | Facility: CLINIC | Age: 45
End: 2020-11-05

## 2020-11-14 ENCOUNTER — HEALTH MAINTENANCE LETTER (OUTPATIENT)
Age: 45
End: 2020-11-14

## 2021-05-14 NOTE — PROGRESS NOTES
Sonya is a 45 year old  female who presents for annual exam.     Menses are regular q 28-30 days and heavy lasting 7 days.  Menses flow: heavy.  Patient's last menstrual period was 2021 (approximate).. Using natural family planning for contraception.  She is not currently considering pregnancy.  Besides routine health maintenance,  she would like to discuss ance treatment, and BC consult.  Lifelong acne, hoping to start accutane. Her son just did it and had an excellent response. She has done OCPs, nuvaring, IUD in the past. Wanted tubal ligation at time of  but couldn't do it. Doesn't tolerate side effects of a lot of medications.  PHQ 2017   PHQ-9 Total Score 1 0 7   Q9: Thoughts of better off dead/self-harm past 2 weeks Not at all Not at all Not at all     Feels mood is ok. Mostly related to COVID pandemic, but coping.   GYNECOLOGIC HISTORY:  Menarche: 13  Age at first intercourse: 21 Number of lifetime partners: less than 6  Sonya is sexually active with male partner(s) and is currently in monogamous relationship with .    History sexually transmitted infections:No STD history  STI testing offered?  Declined  BOBBY exposure: No  History of abnormal Pap smear: NO - age 30- 65 PAP every 3 years recommended  Family history of breast CA: No  Family history of uterine/ovarian CA: Yes (Please explain): mother uterine    Family history of colon CA: No    HEALTH MAINTENANCE:  Cholesterol: (  Cholesterol   Date Value Ref Range Status   2014 172 <200 mg/dL Final     Comment:     LDL Cholesterol is the primary guide to therapy.   The NCEP recommends further evaluation of: patients with cholesterol greater   than 200 mg/dL if additional risk factors are present, cholesterol greater   than   240 mg/dL, triglycerides greater than 150 mg/dL, or HDL less than 40 mg/dL.     2012 208 (H) 0 - 200 mg/dL Final     Comment:     LDL Cholesterol is the primary guide  "to therapy.   The NCEP recommends further evaluation of: patients with cholesterol greater   than 200 mg/dL if additional risk factors are present, cholesterol greater   than   240 mg/dL, triglycerides greater than 150 mg/dL, or HDL less than 40 mg/dL.    History of abnormal lipids: No  Mammo: 2017 . History of abnormal Mammo: No.  Regular Self Breast Exams: Yes  Calcium/Vitamin D intake: source:  dairy, green leafy veggies Adequate? Yes  TSH: (  TSH   Date Value Ref Range Status   02/10/2017 1.61 0.40 - 4.00 mU/L Final    )  Pap; (  Lab Results   Component Value Date    PAP NIL 2016    PAP NIL 2012    PAP NIL 2009    )    HISTORY:  OB History    Para Term  AB Living   2 2 1 1 0 2   SAB TAB Ectopic Multiple Live Births   0 0 0 0 2      # Outcome Date GA Lbr Jayson/2nd Weight Sex Delivery Anes PTL Lv   2  09 36w0d  2.948 kg (6 lb 8 oz) M CS-LTranv   TRACEY      Name: Kenny Vines Term 04 41w0d  3.941 kg (8 lb 11 oz) M    TRACEY      Name: Jaspal     Past Medical History:   Diagnosis Date     NO ACTIVE PROBLEMS      Past Surgical History:   Procedure Laterality Date     NO HISTORY OF SURGERY       Family History   Problem Relation Age of Onset     Hypertension Mother      Cancer Mother         Uterine     Gastrointestinal Disease Mother         \"bacteria in stomach\"     Thyroid Disease Mother      Heart Disease Mother      Heart Disease Maternal Grandfather         MI     Heart Disease Paternal Grandfather         MI     Social History     Socioeconomic History     Marital status:      Spouse name: Not on file     Number of children: 1     Years of education: Not on file     Highest education level: Not on file   Occupational History     Employer: [employer]   Social Needs     Financial resource strain: Not on file     Food insecurity     Worry: Not on file     Inability: Not on file     Transportation needs     Medical: Not on file     Non-medical: Not on " file   Tobacco Use     Smoking status: Never Smoker     Smokeless tobacco: Never Used   Substance and Sexual Activity     Alcohol use: No     Alcohol/week: 3.3 standard drinks     Drug use: No     Sexual activity: Yes     Partners: Male     Birth control/protection: I.U.D.     Comment: Mirena   Lifestyle     Physical activity     Days per week: Not on file     Minutes per session: Not on file     Stress: Not on file   Relationships     Social connections     Talks on phone: Not on file     Gets together: Not on file     Attends [employer] service: Not on file     Active member of club or organization: Not on file     Attends meetings of clubs or organizations: Not on file     Relationship status: Not on file     Intimate partner violence     Fear of current or ex partner: Not on file     Emotionally abused: Not on file     Physically abused: Not on file     Forced sexual activity: Not on file   Other Topics Concern      Service Not Asked     Blood Transfusions No     Caffeine Concern Not Asked     Occupational Exposure No     Hobby Hazards No     Sleep Concern Not Asked     Stress Concern Not Asked     Weight Concern Not Asked     Special Diet Not Asked     Back Care Not Asked     Exercise Not Asked     Bike Helmet Not Asked     Seat Belt Not Asked     Self-Exams Not Asked     Parent/sibling w/ CABG, MI or angioplasty before 65F 55M? No   Social History Narrative     Not on file       Current Outpatient Medications:      adapalene 0.3 % gel, Apply topically At Bedtime, Disp: , Rfl:      celecoxib (CELEBREX) 200 MG capsule, Take 200 mg by mouth 2 times daily, Disp: , Rfl:      cyclobenzaprine (FLEXERIL) 10 MG tablet, Take 0.5-1 tablets (5-10 mg) by mouth 3 times daily as needed for muscle spasms (Patient not taking: Reported on 5/18/2021), Disp: 30 tablet, Rfl: 1     dapsone (ACZONE) 7.5 % gel, Apply topically daily, Disp: , Rfl:      DOXYCYCLINE HYCLATE PO, Take 50 mg by mouth 2 times daily, Disp: , Rfl:       "Multiple Vitamin (DAILY MULTIVITAMIN PO), Take  by mouth., Disp: , Rfl:      Sulfacetamide Sodium 10 % LIQD, , Disp: , Rfl:      Allergies   Allergen Reactions     No Known Drug Allergies        Past medical, surgical, social and family history were reviewed and updated in EPIC.    ROS:   C:     NEGATIVE for fever, chills, change in weight  I:       NEGATIVE for worrisome rashes, moles or lesions  E:     NEGATIVE for vision changes or irritation  E/M: NEGATIVE for ear, mouth and throat problems  R:     NEGATIVE for significant cough or SOB  CV:   NEGATIVE for chest pain, palpitations or peripheral edema  GI:     NEGATIVE for nausea, abdominal pain, heartburn, or change in bowel habits  :   NEGATIVE for frequency, dysuria, hematuria, vaginal discharge, or irregular bleeding  M:     NEGATIVE for significant arthralgias or myalgia  N:      NEGATIVE for weakness, dizziness or paresthesias  E:      NEGATIVE for temperature intolerance, skin/hair changes  P:      NEGATIVE for changes in mood or affect.    EXAM:  /70 (BP Location: Right arm, Patient Position: Sitting, Cuff Size: Adult Regular)   Pulse 76   Temp 97.9  F (36.6  C) (Oral)   Ht 1.702 m (5' 7\")   Wt 74.7 kg (164 lb 9.6 oz)   LMP 05/03/2021 (Approximate)   Breastfeeding No   BMI 25.78 kg/m     BMI: Body mass index is 25.78 kg/m .  Constitutional: healthy, alert and no distress  Head: Normocephalic. No masses, lesions, tenderness or abnormalities  Neck: Neck supple. Trachea midline. No adenopathy. Thyroid symmetric, normal size.   Cardiovascular: RRR.   Respiratory: Negative.   Breast: Breasts reveal mild symmetric fibrocystic densities, but there are no dominant, discrete, fixed or suspicious masses found.  Gastrointestinal: Abdomen soft, non-tender, non-distended. No masses, organomegaly.  :  Vulva:  No external lesions, normal female hair distribution, no inguinal adenopathy.    Urethra:  Midline, non-tender, well supported, no " discharge  Vagina:  Moist, pink, no abnormal discharge, no lesions  Uterus:  Normal size, anteverted , non-tender, freely mobile  Ovaries:  No masses appreciated, non-tender, mobile  Rectal Exam: deferred  Musculoskeletal: extremities normal  Skin: no suspicious lesions or rashes  Psychiatric: Affect appropriate, cooperative,mentation appears normal.     COUNSELING:   Reviewed preventive health counseling, as reflected in patient instructions       Regular exercise       Healthy diet/nutrition       Contraception       Family planning       Colon cancer screening   reports that she has never smoked. She has never used smokeless tobacco.    Body mass index is 25.78 kg/m .  Weight management plan: Discussed healthy diet and exercise guidelines  FRAX Risk Assessment    ASSESSMENT:  45 year old female with satisfactory annual exam  (Z01.419) Encounter for gynecological examination without abnormal finding  (primary encounter diagnosis)  Comment:   Plan: Pap today. Mammogram ordered. Lipids, glucose today.   Discussed colon cancer screening next year    (Z12.4) Screening for malignant neoplasm of cervix  Comment:   Plan: HPV High Risk Types DNA Cervical, Pap imaged         thin layer screen with HPV - recommended age 30        - 65 years (select HPV order below)            (Z12.31) Encounter for screening mammogram for breast cancer  Comment:   Plan: MA Screening Digital Bilateral            (Z13.1) Encounter for screening examination for impaired glucose regulation and diabetes mellitus  Comment:   Plan: Glucose            (Z13.220) Lipid screening  Comment:   Plan: Lipid Profile (Chol, Trig, HDL, LDL calc)              (Z30.2) Encounter for sterilization  Comment:   Plan: Case Request: SALPINGECTOMY, LAPAROSCOPIC        Discussed surgery, alternatives, procedure, risks.

## 2021-05-18 ENCOUNTER — OFFICE VISIT (OUTPATIENT)
Dept: OBGYN | Facility: CLINIC | Age: 46
End: 2021-05-18
Payer: COMMERCIAL

## 2021-05-18 VITALS
DIASTOLIC BLOOD PRESSURE: 70 MMHG | HEART RATE: 76 BPM | TEMPERATURE: 97.9 F | WEIGHT: 164.6 LBS | HEIGHT: 67 IN | SYSTOLIC BLOOD PRESSURE: 108 MMHG | BODY MASS INDEX: 25.83 KG/M2

## 2021-05-18 DIAGNOSIS — Z13.1 ENCOUNTER FOR SCREENING EXAMINATION FOR IMPAIRED GLUCOSE REGULATION AND DIABETES MELLITUS: ICD-10-CM

## 2021-05-18 DIAGNOSIS — Z12.31 ENCOUNTER FOR SCREENING MAMMOGRAM FOR BREAST CANCER: ICD-10-CM

## 2021-05-18 DIAGNOSIS — Z01.419 ENCOUNTER FOR GYNECOLOGICAL EXAMINATION WITHOUT ABNORMAL FINDING: Primary | ICD-10-CM

## 2021-05-18 DIAGNOSIS — Z30.2 ENCOUNTER FOR STERILIZATION: ICD-10-CM

## 2021-05-18 DIAGNOSIS — Z12.4 SCREENING FOR MALIGNANT NEOPLASM OF CERVIX: ICD-10-CM

## 2021-05-18 DIAGNOSIS — Z13.220 LIPID SCREENING: ICD-10-CM

## 2021-05-18 LAB
CHOLEST SERPL-MCNC: 214 MG/DL
GLUCOSE SERPL-MCNC: 82 MG/DL (ref 70–99)
HDLC SERPL-MCNC: 38 MG/DL
LDLC SERPL CALC-MCNC: 126 MG/DL
NONHDLC SERPL-MCNC: 176 MG/DL
TRIGL SERPL-MCNC: 250 MG/DL

## 2021-05-18 PROCEDURE — 87624 HPV HI-RISK TYP POOLED RSLT: CPT | Performed by: OBSTETRICS & GYNECOLOGY

## 2021-05-18 PROCEDURE — 80061 LIPID PANEL: CPT | Performed by: OBSTETRICS & GYNECOLOGY

## 2021-05-18 PROCEDURE — G0145 SCR C/V CYTO,THINLAYER,RESCR: HCPCS | Performed by: OBSTETRICS & GYNECOLOGY

## 2021-05-18 PROCEDURE — 82947 ASSAY GLUCOSE BLOOD QUANT: CPT | Performed by: OBSTETRICS & GYNECOLOGY

## 2021-05-18 PROCEDURE — 99386 PREV VISIT NEW AGE 40-64: CPT | Performed by: OBSTETRICS & GYNECOLOGY

## 2021-05-18 PROCEDURE — 36415 COLL VENOUS BLD VENIPUNCTURE: CPT | Performed by: OBSTETRICS & GYNECOLOGY

## 2021-05-18 ASSESSMENT — ANXIETY QUESTIONNAIRES
5. BEING SO RESTLESS THAT IT IS HARD TO SIT STILL: NOT AT ALL
IF YOU CHECKED OFF ANY PROBLEMS ON THIS QUESTIONNAIRE, HOW DIFFICULT HAVE THESE PROBLEMS MADE IT FOR YOU TO DO YOUR WORK, TAKE CARE OF THINGS AT HOME, OR GET ALONG WITH OTHER PEOPLE: NOT DIFFICULT AT ALL
6. BECOMING EASILY ANNOYED OR IRRITABLE: SEVERAL DAYS
3. WORRYING TOO MUCH ABOUT DIFFERENT THINGS: SEVERAL DAYS
7. FEELING AFRAID AS IF SOMETHING AWFUL MIGHT HAPPEN: SEVERAL DAYS
1. FEELING NERVOUS, ANXIOUS, OR ON EDGE: SEVERAL DAYS
GAD7 TOTAL SCORE: 6
2. NOT BEING ABLE TO STOP OR CONTROL WORRYING: SEVERAL DAYS

## 2021-05-18 ASSESSMENT — PATIENT HEALTH QUESTIONNAIRE - PHQ9
SUM OF ALL RESPONSES TO PHQ QUESTIONS 1-9: 7
5. POOR APPETITE OR OVEREATING: SEVERAL DAYS

## 2021-05-18 ASSESSMENT — MIFFLIN-ST. JEOR: SCORE: 1429.25

## 2021-05-19 ASSESSMENT — ANXIETY QUESTIONNAIRES: GAD7 TOTAL SCORE: 6

## 2021-05-20 LAB
COPATH REPORT: NORMAL
PAP: NORMAL

## 2021-05-21 ENCOUNTER — PREP FOR PROCEDURE (OUTPATIENT)
Dept: OBGYN | Facility: CLINIC | Age: 46
End: 2021-05-21

## 2021-05-24 LAB
FINAL DIAGNOSIS: NORMAL
HPV HR 12 DNA CVX QL NAA+PROBE: NEGATIVE
HPV16 DNA SPEC QL NAA+PROBE: NEGATIVE
HPV18 DNA SPEC QL NAA+PROBE: NEGATIVE
SPECIMEN DESCRIPTION: NORMAL
SPECIMEN SOURCE CVX/VAG CYTO: NORMAL

## 2021-05-25 ENCOUNTER — OFFICE VISIT (OUTPATIENT)
Dept: FAMILY MEDICINE | Facility: CLINIC | Age: 46
End: 2021-05-25
Payer: COMMERCIAL

## 2021-05-25 VITALS
BODY MASS INDEX: 25.58 KG/M2 | DIASTOLIC BLOOD PRESSURE: 70 MMHG | WEIGHT: 163 LBS | HEART RATE: 68 BPM | TEMPERATURE: 98.3 F | SYSTOLIC BLOOD PRESSURE: 112 MMHG | HEIGHT: 67 IN

## 2021-05-25 DIAGNOSIS — L29.9 ITCHING: ICD-10-CM

## 2021-05-25 DIAGNOSIS — B07.9 VIRAL WARTS, UNSPECIFIED TYPE: Primary | ICD-10-CM

## 2021-05-25 LAB
ALBUMIN SERPL-MCNC: 3.8 G/DL (ref 3.4–5)
ALP SERPL-CCNC: 82 U/L (ref 40–150)
ALT SERPL W P-5'-P-CCNC: 25 U/L (ref 0–50)
ANION GAP SERPL CALCULATED.3IONS-SCNC: 7 MMOL/L (ref 3–14)
AST SERPL W P-5'-P-CCNC: 11 U/L (ref 0–45)
BILIRUB SERPL-MCNC: 0.6 MG/DL (ref 0.2–1.3)
BUN SERPL-MCNC: 13 MG/DL (ref 7–30)
CALCIUM SERPL-MCNC: 8.8 MG/DL (ref 8.5–10.1)
CHLORIDE SERPL-SCNC: 107 MMOL/L (ref 94–109)
CO2 SERPL-SCNC: 25 MMOL/L (ref 20–32)
CREAT SERPL-MCNC: 0.81 MG/DL (ref 0.52–1.04)
ERYTHROCYTE [DISTWIDTH] IN BLOOD BY AUTOMATED COUNT: 12.7 % (ref 10–15)
GFR SERPL CREATININE-BSD FRML MDRD: 88 ML/MIN/{1.73_M2}
GLUCOSE SERPL-MCNC: 85 MG/DL (ref 70–99)
HCT VFR BLD AUTO: 38.9 % (ref 35–47)
HGB BLD-MCNC: 12.8 G/DL (ref 11.7–15.7)
MCH RBC QN AUTO: 31 PG (ref 26.5–33)
MCHC RBC AUTO-ENTMCNC: 32.9 G/DL (ref 31.5–36.5)
MCV RBC AUTO: 94 FL (ref 78–100)
PLATELET # BLD AUTO: 249 10E9/L (ref 150–450)
POTASSIUM SERPL-SCNC: 4 MMOL/L (ref 3.4–5.3)
PROT SERPL-MCNC: 8 G/DL (ref 6.8–8.8)
RBC # BLD AUTO: 4.13 10E12/L (ref 3.8–5.2)
SODIUM SERPL-SCNC: 139 MMOL/L (ref 133–144)
WBC # BLD AUTO: 6.7 10E9/L (ref 4–11)

## 2021-05-25 PROCEDURE — 85027 COMPLETE CBC AUTOMATED: CPT | Performed by: NURSE PRACTITIONER

## 2021-05-25 PROCEDURE — 80053 COMPREHEN METABOLIC PANEL: CPT | Performed by: NURSE PRACTITIONER

## 2021-05-25 PROCEDURE — 17110 DESTRUCTION B9 LES UP TO 14: CPT | Performed by: NURSE PRACTITIONER

## 2021-05-25 PROCEDURE — 99213 OFFICE O/P EST LOW 20 MIN: CPT | Mod: 25 | Performed by: NURSE PRACTITIONER

## 2021-05-25 PROCEDURE — 36415 COLL VENOUS BLD VENIPUNCTURE: CPT | Performed by: NURSE PRACTITIONER

## 2021-05-25 ASSESSMENT — MIFFLIN-ST. JEOR: SCORE: 1421.99

## 2021-05-25 NOTE — PROGRESS NOTES
"    Assessment & Plan     Viral warts, unspecified type  The viral etiology and natural history has been discussed.  Various treatment methods, side effects, and failure rates have been discussed.    Prior to beginning the procedure, patient identity was verified, as well as the procedure to be performed and the correct side/site.  All equipment required was ready and available.  The patent was positioned appropriately.    A choice of liquid nitrogen was made, and the expected blistering or scabbing reaction explained.  Three applications of liquid nitrogen was applied to wart on right 2nd digit and wart on left plantar foot and patient tolerated procedure.  The patient will return at 4 week intervals for retreatment as needed.    Patient/guardian verbalized understanding and agreement with the plan of care.  - DESTRUCT BENIGN LESION, UP TO 14    Itching    - Comprehensive metabolic panel (BMP + Alb, Alk Phos, ALT, AST, Total. Bili, TP)  - CBC with platelets          Return in about 4 weeks (around 6/22/2021) for if symptoms worsen or fail to improve.    Gabrielle Ray NP  [facility]    Rocael Rowe is a 45 year old who presents for the following health issues     HPI     Warts  Onset/Duration: couple of years   Description (location/number): one on each foot  Accompanying signs and symptoms (pain, redness):  no   History: prior warts:  no   Therapies tried and outcome: OTC meds    Right 2nd digit medial side  Left plantar foot    Review of Systems   Constitutional, HEENT, cardiovascular, pulmonary, gi and skin systems are negative, except as otherwise noted.      Objective    /70   Pulse 68   Temp 98.3  F (36.8  C) (Oral)   Ht 1.702 m (5' 7\")   Wt 73.9 kg (163 lb)   LMP 05/03/2021 (Approximate)   BMI 25.53 kg/m    Body mass index is 25.53 kg/m .  Physical Exam   GENERAL: healthy, alert and no distress  SKIN: hyperkeratotic papules right 2nd digit and left plantar " foot  PSYCH: mentation appears normal, affect normal/bright

## 2021-05-27 ENCOUNTER — ANCILLARY PROCEDURE (OUTPATIENT)
Dept: MAMMOGRAPHY | Facility: CLINIC | Age: 46
End: 2021-05-27
Attending: OBSTETRICS & GYNECOLOGY
Payer: COMMERCIAL

## 2021-05-27 DIAGNOSIS — Z12.31 ENCOUNTER FOR SCREENING MAMMOGRAM FOR BREAST CANCER: ICD-10-CM

## 2021-05-27 PROCEDURE — 77067 SCR MAMMO BI INCL CAD: CPT | Mod: TC | Performed by: RADIOLOGY

## 2021-09-02 ENCOUNTER — OFFICE VISIT (OUTPATIENT)
Dept: OBGYN | Facility: CLINIC | Age: 46
End: 2021-09-02
Payer: COMMERCIAL

## 2021-09-02 VITALS
BODY MASS INDEX: 25.69 KG/M2 | DIASTOLIC BLOOD PRESSURE: 70 MMHG | HEART RATE: 71 BPM | TEMPERATURE: 97.7 F | SYSTOLIC BLOOD PRESSURE: 107 MMHG | WEIGHT: 164 LBS

## 2021-09-02 DIAGNOSIS — Z30.430 ENCOUNTER FOR INSERTION OF INTRAUTERINE CONTRACEPTIVE DEVICE: Primary | ICD-10-CM

## 2021-09-02 LAB — HCG UR QL: NEGATIVE

## 2021-09-02 PROCEDURE — 81025 URINE PREGNANCY TEST: CPT | Performed by: OBSTETRICS & GYNECOLOGY

## 2021-09-02 PROCEDURE — 58300 INSERT INTRAUTERINE DEVICE: CPT | Performed by: OBSTETRICS & GYNECOLOGY

## 2021-09-02 RX ORDER — CEFUROXIME AXETIL 250 MG/1
TABLET ORAL
COMMUNITY
Start: 2021-07-14 | End: 2022-12-05

## 2021-09-02 ASSESSMENT — PATIENT HEALTH QUESTIONNAIRE - PHQ9: SUM OF ALL RESPONSES TO PHQ QUESTIONS 1-9: 0

## 2021-09-02 NOTE — PROGRESS NOTES
IUD Insertion:  CONSULT:    Is a pregnancy test required: Yes.  Was it positive or negative?  Negative  Was a consent obtained?  Yes    Subjective: Sonya Blackmon is a 45 year old  presents for IUD and desires Mirena type IUD.    Patient has been given the opportunity to ask questions about all forms of birth control, including all options appropriate for Sonya Blackmon. Discussed that no method of birth control, except abstinence is 100% effective against pregnancy or sexually transmitted infection.     Sonya Blackmon understands she may have the IUD removed at any time. IUD should be removed by a health care provider.    The entire insertion procedure was reviewed with the patient, including care after placement.    Patient's last menstrual period was 2021. . No allergy to betadine or shellfish.   HCG Qual Urine   Date Value Ref Range Status   2009  NEG Final    Negative   This test provides a presumptive diagnosis of pregnancy or non-pregnancy. A   confirmed pregnancy diagnosis should only be made by a physician after all   clinical and laboratory findings have been evaluated.     hCG Urine Qualitative   Date Value Ref Range Status   2021 Negative Negative Final     Comment:     This test is for screening purposes.  Results should be interpreted along with the clinical picture.  Confirmation testing is available if warranted by ordering [id number], HCG Quantitative Pregnancy.         /70   Pulse 71   Temp 97.7  F (36.5  C) (Oral)   Wt 74.4 kg (164 lb)   LMP 2021   Breastfeeding No   BMI 25.69 kg/m      Pelvic Exam:   EG/BUS: normal genital architecture without lesions, erythema or abnormal secretions.   Vagina: moist, pink, rugae with physiologic discharge and secretions  Cervix: parous no lesions and pink, moist, closed, without lesion or CMT  Uterus: mid-position, mobile, no pain  Adnexa: within normal limits and no masses, nodularity,  tenderness    PROCEDURE NOTE: -- IUD Insertion    Reason for Insertion: contraception and abnormal uterine bleeding      Under sterile technique, cervix was visualized with speculum and prepped with Betadine solution swab x 3. Tenaculum was placed for stability. The uterus was gently straightened and sounded to 8.0 cm. IUD prepared for placement, and IUD inserted according to 's instructions without difficulty or significant resitance, and deployed at the fundus. The strings were visualized and trimmed to 3.0 cm from the external os. Tenaculum was removed and hemostasis noted. Speculum removed.  Patient tolerated procedure well.    Lot # [id number]#:[id number] EXP:10/2023      EBL: minimal    Complications: none    ASSESSMENT:     ICD-10-CM    1. Encounter for insertion of intrauterine contraceptive device  Z30.430 levonorgestrel (MIRENA) 20 MCG/24HR IUD     levonorgestrel (MIRENA) 20 MCG/24HR IUD 20 mcg     INSERTION INTRAUTERINE DEVICE     HCG Qual, Urine ([id number])        PLAN:    Given 's handouts, including when to have IUD removed, list of danger s/sx, side effects and follow up recommended. Encouraged condom use for prevention of STD. Back up contraception advised for 7 days if progestin method. Advised to call for any fever, for prolonged or severe pain or bleeding, abnormal vaginal discharge, or unable to palpate strings. She was advised to use pain medications (ibuprofen) as needed for mild to moderate pain. Advised to follow-up in clinic in 4-6 weeks for IUD string check if unable to find strings or as directed by provider.     Sujey Sena MD

## 2021-09-12 ENCOUNTER — HEALTH MAINTENANCE LETTER (OUTPATIENT)
Age: 46
End: 2021-09-12

## 2021-09-23 ENCOUNTER — TRANSFERRED RECORDS (OUTPATIENT)
Dept: HEALTH INFORMATION MANAGEMENT | Facility: CLINIC | Age: 46
End: 2021-09-23

## 2021-11-29 ENCOUNTER — TRANSFERRED RECORDS (OUTPATIENT)
Dept: HEALTH INFORMATION MANAGEMENT | Facility: CLINIC | Age: 46
End: 2021-11-29
Payer: COMMERCIAL

## 2022-01-06 ENCOUNTER — TRANSFERRED RECORDS (OUTPATIENT)
Dept: HEALTH INFORMATION MANAGEMENT | Facility: CLINIC | Age: 47
End: 2022-01-06
Payer: COMMERCIAL

## 2022-02-15 ENCOUNTER — TRANSFERRED RECORDS (OUTPATIENT)
Dept: HEALTH INFORMATION MANAGEMENT | Facility: CLINIC | Age: 47
End: 2022-02-15
Payer: COMMERCIAL

## 2022-03-22 ENCOUNTER — TRANSFERRED RECORDS (OUTPATIENT)
Dept: HEALTH INFORMATION MANAGEMENT | Facility: CLINIC | Age: 47
End: 2022-03-22
Payer: COMMERCIAL

## 2022-05-11 ENCOUNTER — OFFICE VISIT (OUTPATIENT)
Dept: PHYSICAL MEDICINE AND REHAB | Facility: CLINIC | Age: 47
End: 2022-05-11
Payer: COMMERCIAL

## 2022-05-11 VITALS
BODY MASS INDEX: 25.22 KG/M2 | WEIGHT: 161 LBS | HEART RATE: 76 BPM | SYSTOLIC BLOOD PRESSURE: 110 MMHG | DIASTOLIC BLOOD PRESSURE: 66 MMHG

## 2022-05-11 DIAGNOSIS — M51.26 LUMBAR DISC HERNIATION: ICD-10-CM

## 2022-05-11 DIAGNOSIS — M79.18 MYOFASCIAL PAIN: ICD-10-CM

## 2022-05-11 DIAGNOSIS — M54.50 LUMBAR SPINE PAIN: Primary | ICD-10-CM

## 2022-05-11 PROCEDURE — 99204 OFFICE O/P NEW MOD 45 MIN: CPT | Performed by: PHYSICAL MEDICINE & REHABILITATION

## 2022-05-11 RX ORDER — MELOXICAM 15 MG/1
7.5-15 TABLET ORAL DAILY PRN
Qty: 30 TABLET | Refills: 1 | Status: SHIPPED | OUTPATIENT
Start: 2022-05-11 | End: 2023-10-25

## 2022-05-11 ASSESSMENT — PAIN SCALES - GENERAL: PAINLEVEL: MODERATE PAIN (5)

## 2022-05-11 NOTE — LETTER
5/11/2022         RE: Sonya Blackmon  [address] [facility] [address]        Dear Colleague,    Thank you for referring your patient, Sonya Blackmon, to the [facility]. Please see a copy of my visit note below.    Assessment/Plan:      Sonya was seen today for back pain.    Diagnoses and all orders for this visit:    Lumbar spine pain  -     MR Lumbar Spine w/o Contrast; Future  -     meloxicam (MOBIC) 15 MG tablet; Take 0.5-1 tablets (7.5-15 mg) by mouth daily as needed for pain    Lumbar disc herniation  -     MR Lumbar Spine w/o Contrast; Future  -     meloxicam (MOBIC) 15 MG tablet; Take 0.5-1 tablets (7.5-15 mg) by mouth daily as needed for pain    Myofascial pain  -     meloxicam (MOBIC) 15 MG tablet; Take 0.5-1 tablets (7.5-15 mg) by mouth daily as needed for pain         Assessment: Pleasant 46 year old female otherwise healthy with:    1.  Chronic 5-year history of low back pain at the lumbosacral junction.  She has a history of central disc herniation at L4-5 with a right paracentral component in 2019 which had some mild improvement for short time after lumbar epidural L4-5 interlaminar in 2019 at [facility] chiropractic and physical therapy but has returned.  Low back pain without radiculopathy at this time.    2.  Myofascial pain lumbar spine.      Discussion:    1.  I discussed the diagnosis and treatment options we discussed the options of starting physical therapy along with repeat imaging interventions medications.  She has a trip planned in the next 6 weeks to [place] and wants to be aggressive so she does not spend another trip in the hospital.    2.  Given the disc herniation in 2019 recommend updated MRI to evaluate for ongoing disc herniation or progressive central stenosis or lateral recess stenosis.    3.  Trial meloxicam 7.5-15 mg daily as needed.    4.  Follow-up in a week and a half.  Can consider physical therapy such as  MedX along with OMT and or interventions after MRI.      It was our pleasure caring for your patient today, if there any questions or concerns please do not hesitate to contact us.      Subjective:   Patient ID: Sonya Blackmon is a 46 year old female.    History of Present Illness:Patient presents for evaluation of low back pain.  She presents at the request of Dr. Becca Pardo.  She first of low back pain around 2017 with no injury.  Pain with getting in and out of the car from sitting to standing shooting pain in the back.   manage this conservatively.  Travel to [facility] in 2019 and while on the trip had severe low back pain was hospitalized in [facility] [place]note 2].  She then return to did physical therapy and chiropractic for about a year.  She also was seen at [facility] for L4-5 interlaminar epidural steroid injection on October 25, 2019 records were reviewed.  She tells me this offered some mild benefit and she did fairly well with the back pain but still has always had pain which is worsened over the last few years again.  The right is now worse than the left constant daily pain at the lumbosacral junction with occasional numbness and tingling down the back of the legs to the knees.  Pain is worse with any prolonged sitting or walking along with lifting or bending.  Sitting to stand transition is the worst for her.  Better with changing positions.  Does take Tylenol.  Pain is an 8/10 at worst 5/10 today 2/10 at best.  She is traveling in about 6 weeks to [place] and wants to make sure she knows what is going on prior to her trip so she does not end up in the hospital.      Imaging: MRI report and images were personally reviewed and discussed with the patient.  A plastic model was utilized during the discussion.  MRI of the lumbar spine personally reviewed at [facility] 2019.  Normal disc heights with the exception of mild disc height loss L4-5 with T2 signal loss central and right paracentral disc  "herniation with right lateral recess stenosis encroachment of the right L5 nerve.      Review of Systems: Patient complains of headaches, change in vision, swelling of the feet, muscle pain muscle fatigue and \"sciatica \".  Denies fevers, weight loss, weight gain, hoarseness, ringing in the ears, eye pain, chest pain, shortness of breath, abdominal pain, nausea, vomiting, bowel or bladder incontinence, skin issues, balance issues. Remainder of 12 point review systems negative unless listed above.    Past Medical History:   Diagnosis Date     Cholestasis of pregnancy 8/7/2009    Presumed secondary to sx and elevated LFTs, bile acids normal  Plan 2/week BPP and IOL 37-38 wks, no amnio--all per [facility] recc     NO ACTIVE PROBLEMS        Family History   Problem Relation Age of Onset     Hypertension Mother      Cancer Mother         Uterine     Gastrointestinal Disease Mother         \"bacteria in stomach\"     Thyroid Disease Mother      Heart Disease Mother      Heart Disease Maternal Grandfather         MI     Heart Disease Paternal Grandfather         MI         Social History     Socioeconomic History     Marital status:      Spouse name: None     Number of children: 1     Years of education: None     Highest education level: None   Occupational History     Employer: [email or web address]   Tobacco Use     Smoking status: Never Smoker     Smokeless tobacco: Never Used   Substance and Sexual Activity     Alcohol use: No     Alcohol/week: 3.3 standard drinks     Drug use: No     Sexual activity: Yes     Partners: Male     Birth control/protection: Natural Family Planning   Other Topics Concern     Blood Transfusions No     Occupational Exposure No     Hobby Hazards No     Parent/sibling w/ CABG, MI or angioplasty before 65F 55M? No     Social history: .  2 teenage boys when graduating high school going to [employer].  No tobacco or alcohol.  Works at a desk job.      The following portions of the patient's " history were reviewed and updated as appropriate: allergies, current medications, past family history, past medical history, past social history, past surgical history and problem list.    Oswestry (CHANTEL) Questionnaire    OSWESTRY DISABILITY INDEX 5/11/2022   Count 10   Sum 14   Oswestry Score (%) 28   Some recent data might be hidden       Neck Disability Index:  No flowsheet data found.       PHQ-2 Score:     PHQ-2 ( 1999 Pfizer) 9/2/2021 5/18/2021   Q1: Little interest or pleasure in doing things 0 1   Q2: Feeling down, depressed or hopeless 0 1   PHQ-2 Score 0 2   PHQ-2 Total Score (12-17 Years)- Positive if 3 or more points; Administer PHQ-A if positive 0 2                  Objective:   Physical Exam:    /66   Pulse 76   Wt 161 lb (73 kg)   BMI 25.22 kg/m    Body mass index is 25.22 kg/m .      General:  Well-appearing female in no acute distress.  Pleasant, cooperative, and interactive throughout the examination and interview.  CV: No lower extremity edema on inspection or paltation.  Lymphatics: No cervical lymphadenopathy palpated. Eyes: sclera clear. Skin: No rashes or lesions seen over the head/neck, hairline, arms, legs.  Respirations unlabored.  MSK: Gait is nonantalgic.  Able to heel-toe walk without difficulty.  Negative Romberg.  Spine: normal AP curves of the C, T, and L spine.  Mild decreased lumbar flexion finger to floor testing.  Palpation: Tenderness to palpation over L4-L5-S1 paraspinals mildly.  Minimal positive facet loading bilaterally.  Extremities: Full range of motion of the elbows, and wrists with no effusions or tenderness to palpation.   Full range of motion of the hips, knees, and ankles with no effusions or tenderness to palpation.  Negative scour maneuver and Melquiades's test bilaterally.  Mild low back pain on the right with thigh thrust.  Negative AP compression of the pelvis.  No hypermobility of the upper or lower extremities.  Neurologic exam: Mental status: Patient is  alert and oriented with normal affect.  Attention, knowledge, memory, and language are intact.  Normal coordination throughout the examination.  Reflexes are 2+ and symmetric biceps, triceps, brachioradialis, patellar, and Achilles with down-going toes and Negative Mariah's.  Sensation is intact to light touch throughout the upper and lower extremities bilaterally.  Manual muscle testing reveals 5 out of 5 in the hip flexors, knee flexors/extensors, ankle plantar flexors, ankle  dorsiflexors, and EHL.  Upper extremities: Grossly normal strength . Normal muscle bulk and tone in the arms and legs.    Negative seated and supine straight leg raise bilaterally.  Tight hamstrings bilaterally.            Again, thank you for allowing me to participate in the care of your patient.        Sincerely,        Rodriguez Leo, DO

## 2022-05-11 NOTE — PROGRESS NOTES
Assessment/Plan:      Sonya was seen today for back pain.    Diagnoses and all orders for this visit:    Lumbar spine pain  -     MR Lumbar Spine w/o Contrast; Future  -     meloxicam (MOBIC) 15 MG tablet; Take 0.5-1 tablets (7.5-15 mg) by mouth daily as needed for pain    Lumbar disc herniation  -     MR Lumbar Spine w/o Contrast; Future  -     meloxicam (MOBIC) 15 MG tablet; Take 0.5-1 tablets (7.5-15 mg) by mouth daily as needed for pain    Myofascial pain  -     meloxicam (MOBIC) 15 MG tablet; Take 0.5-1 tablets (7.5-15 mg) by mouth daily as needed for pain         Assessment: Pleasant 46 year old female otherwise healthy with:    1.  Chronic 5-year history of low back pain at the lumbosacral junction.  She has a history of central disc herniation at L4-5 with a right paracentral component in 2019 which had some mild improvement for short time after lumbar epidural L4-5 interlaminar in 2019 at [facility] chiropractic and physical therapy but has returned.  Low back pain without radiculopathy at this time.    2.  Myofascial pain lumbar spine.      Discussion:    1.  I discussed the diagnosis and treatment options we discussed the options of starting physical therapy along with repeat imaging interventions medications.  She has a trip planned in the next 6 weeks to [place] and wants to be aggressive so she does not spend another trip in the hospital.    2.  Given the disc herniation in 2019 recommend updated MRI to evaluate for ongoing disc herniation or progressive central stenosis or lateral recess stenosis.    3.  Trial meloxicam 7.5-15 mg daily as needed.    4.  Follow-up in a week and a half.  Can consider physical therapy such as MedX along with OMT and or interventions after MRI.      It was our pleasure caring for your patient today, if there any questions or concerns please do not hesitate to contact us.    Addendum: Patient will have MRI at [facility] as last MRI was done at that location  she prefers this.     Subjective:   Patient ID: Sonya Blackmon is a 46 year old female.    History of Present Illness:Patient presents for evaluation of low back pain.  She presents at the request of Dr. Becca Pardo.  She first of low back pain around 2017 with no injury.  Pain with getting in and out of the car from sitting to standing shooting pain in the back.   manage this conservatively.  Travel to [facility] in 2019 and while on the trip had severe low back pain was hospitalized in [facility].  She then return to did physical therapy and chiropractic for about a year.  She also was seen at [facility] for L4-5 interlaminar epidural steroid injection on October 25, 2019 records were reviewed.  She tells me this offered some mild benefit and she did fairly well with the back pain but still has always had pain which is worsened over the last few years again.  The right is now worse than the left constant daily pain at the lumbosacral junction with occasional numbness and tingling down the back of the legs to the knees.  Pain is worse with any prolonged sitting or walking along with lifting or bending.  Sitting to stand transition is the worst for her.  Better with changing positions.  Does take Tylenol.  Pain is an 8/10 at worst 5/10 today 2/10 at best.  She is traveling in about 6 weeks to [place] and wants to make sure she knows what is going on prior to her trip so she does not end up in the hospital.      Imaging: MRI report and images were personally reviewed and discussed with the patient.  A plastic model was utilized during the discussion.  MRI of the lumbar spine personally reviewed at [facility] 2019.  Normal disc heights with the exception of mild disc height loss L4-5 with T2 signal loss central and right paracentral disc herniation with right lateral recess stenosis encroachment of the right L5 nerve.      Review of Systems: Patient complains of headaches, change in vision, swelling of  "the feet, muscle pain muscle fatigue and \"sciatica \".  Denies fevers, weight loss, weight gain, hoarseness, ringing in the ears, eye pain, chest pain, shortness of breath, abdominal pain, nausea, vomiting, bowel or bladder incontinence, skin issues, balance issues. Remainder of 12 point review systems negative unless listed above.    Past Medical History:   Diagnosis Date     Cholestasis of pregnancy 8/7/2009    Presumed secondary to sx and elevated LFTs, bile acids normal  Plan 2/week BPP and IOL 37-38 wks, no amnio--all per [facility] recc     NO ACTIVE PROBLEMS        Family History   Problem Relation Age of Onset     Hypertension Mother      Cancer Mother         Uterine     Gastrointestinal Disease Mother         \"bacteria in stomach\"     Thyroid Disease Mother      Heart Disease Mother      Heart Disease Maternal Grandfather         MI     Heart Disease Paternal Grandfather         MI         Social History     Socioeconomic History     Marital status:      Spouse name: None     Number of children: 1     Years of education: None     Highest education level: None   Occupational History     Employer: [employer]   Tobacco Use     Smoking status: Never Smoker     Smokeless tobacco: Never Used   Substance and Sexual Activity     Alcohol use: No     Alcohol/week: 3.3 standard drinks     Drug use: No     Sexual activity: Yes     Partners: Male     Birth control/protection: Natural Family Planning   Other Topics Concern     Blood Transfusions No     Occupational Exposure No     Hobby Hazards No     Parent/sibling w/ CABG, MI or angioplasty before 65F 55M? No     Social history: .  2 teenage boys when graduating high school going to [place].  No tobacco or alcohol.  Works at a desk job.      The following portions of the patient's history were reviewed and updated as appropriate: allergies, current medications, past family history, past medical history, past social history, past surgical history and " problem list.    Oswestry (CHANTEL) Questionnaire    OSWESTRY DISABILITY INDEX 5/11/2022   Count 10   Sum 14   Oswestry Score (%) 28   Some recent data might be hidden       Neck Disability Index:  No flowsheet data found.       PHQ-2 Score:     PHQ-2 ( 1999 Pfizer) 9/2/2021 5/18/2021   Q1: Little interest or pleasure in doing things 0 1   Q2: Feeling down, depressed or hopeless 0 1   PHQ-2 Score 0 2   PHQ-2 Total Score (12-17 Years)- Positive if 3 or more points; Administer PHQ-A if positive 0 2                  Objective:   Physical Exam:    /66   Pulse 76   Wt 161 lb (73 kg)   BMI 25.22 kg/m    Body mass index is 25.22 kg/m .      General:  Well-appearing female in no acute distress.  Pleasant, cooperative, and interactive throughout the examination and interview.  CV: No lower extremity edema on inspection or paltation.  Lymphatics: No cervical lymphadenopathy palpated. Eyes: sclera clear. Skin: No rashes or lesions seen over the head/neck, hairline, arms, legs.  Respirations unlabored.  MSK: Gait is nonantalgic.  Able to heel-toe walk without difficulty.  Negative Romberg.  Spine: normal AP curves of the C, T, and L spine.  Mild decreased lumbar flexion finger to floor testing.  Palpation: Tenderness to palpation over L4-L5-S1 paraspinals mildly.  Minimal positive facet loading bilaterally.  Extremities: Full range of motion of the elbows, and wrists with no effusions or tenderness to palpation.   Full range of motion of the hips, knees, and ankles with no effusions or tenderness to palpation.  Negative scour maneuver and Melquiades's test bilaterally.  Mild low back pain on the right with thigh thrust.  Negative AP compression of the pelvis.  No hypermobility of the upper or lower extremities.  Neurologic exam: Mental status: Patient is alert and oriented with normal affect.  Attention, knowledge, memory, and language are intact.  Normal coordination throughout the examination.  Reflexes are 2+ and symmetric  biceps, triceps, brachioradialis, patellar, and Achilles with down-going toes and Negative Mariah's.  Sensation is intact to light touch throughout the upper and lower extremities bilaterally.  Manual muscle testing reveals 5 out of 5 in the hip flexors, knee flexors/extensors, ankle plantar flexors, ankle  dorsiflexors, and EHL.  Upper extremities: Grossly normal strength . Normal muscle bulk and tone in the arms and legs.    Negative seated and supine straight leg raise bilaterally.  Tight hamstrings bilaterally.

## 2022-05-11 NOTE — PATIENT INSTRUCTIONS
An MRI was ordered for you today.  You will be contacted by scheduling within 3 days.    If you are not contacted, please call  [facility] [phone].   2. Meloxicam (which is an anti-inflammatory) medication is prescribed today. Take 1/2-1   tablet daily as needed for pain. This medication should be taken with food and water to prevent any stomach upset. Do not take ibuprofen/Advil/Motrin/Aleve/naproxen while you take Meloxicam. Please call if you have any side effects.

## 2022-05-17 ENCOUNTER — TRANSFERRED RECORDS (OUTPATIENT)
Dept: HEALTH INFORMATION MANAGEMENT | Facility: CLINIC | Age: 47
End: 2022-05-17
Payer: COMMERCIAL

## 2022-05-20 ENCOUNTER — OFFICE VISIT (OUTPATIENT)
Dept: PHYSICAL MEDICINE AND REHAB | Facility: CLINIC | Age: 47
End: 2022-05-20
Payer: COMMERCIAL

## 2022-05-20 VITALS — HEART RATE: 56 BPM | SYSTOLIC BLOOD PRESSURE: 105 MMHG | DIASTOLIC BLOOD PRESSURE: 58 MMHG

## 2022-05-20 DIAGNOSIS — M51.369 ANNULAR TEAR OF LUMBAR DISC: ICD-10-CM

## 2022-05-20 DIAGNOSIS — M54.16 LUMBAR RADICULAR PAIN: ICD-10-CM

## 2022-05-20 DIAGNOSIS — M54.50 LUMBAR SPINE PAIN: Primary | ICD-10-CM

## 2022-05-20 DIAGNOSIS — M51.26 LUMBAR DISC HERNIATION: ICD-10-CM

## 2022-05-20 DIAGNOSIS — M48.061 STENOSIS OF LATERAL RECESS OF LUMBAR SPINE: ICD-10-CM

## 2022-05-20 DIAGNOSIS — M79.18 MYOFASCIAL PAIN: ICD-10-CM

## 2022-05-20 PROCEDURE — 99214 OFFICE O/P EST MOD 30 MIN: CPT | Performed by: PHYSICAL MEDICINE & REHABILITATION

## 2022-05-20 ASSESSMENT — PAIN SCALES - GENERAL: PAINLEVEL: MODERATE PAIN (5)

## 2022-05-20 NOTE — PROGRESS NOTES
Assessment/Plan:      Sonya was seen today for back pain.    Diagnoses and all orders for this visit:    Lumbar spine pain  -     PAIN Transforaminal NANCY Inj Lumbosacral One Level Bilateral; Future  -     Physical Therapy Referral; Future    Lumbar disc herniation  -     PAIN Transforaminal NANCY Inj Lumbosacral One Level Bilateral; Future  -     Physical Therapy Referral; Future    Annular tear of lumbar disc  -     PAIN Transforaminal NANCY Inj Lumbosacral One Level Bilateral; Future  -     Physical Therapy Referral; Future    Lumbar radicular pain  -     PAIN Transforaminal NANCY Inj Lumbosacral One Level Bilateral; Future  -     Physical Therapy Referral; Future    Stenosis of lateral recess of lumbar spine  -     Physical Therapy Referral; Future    Myofascial pain  -     Physical Therapy Referral; Future         Assessment: Pleasant 46 year old female otherwise healthy with:     1.  Chronic 5-year history of low back pain at the lumbosacral junction.  She has a history of central disc herniation at L4-5 with a right paracentral component in 2019 which symptomatically had some mild improvement for short time after interlaminar lumbar epidural L4-5 interlaminar in 2019 at [facility], chiropractic and physical therapy but has returned.  Currently she has low back pain with mild disc bulge at L4-5 with an annular tear and small central disc herniation.  There is mild right lateral recess stenosis and mild facet arthropathy at L4-5 and L5-S1.  Her last physical therapy was in August 2018 and her pain is persisted since that time.     2.  Myofascial pain lumbar spine.    Discussion:    1 we discussed the diagnosis and treatment options.  She is going on a trip at the end of June and would like some relief prior to that.  Her last therapy was in August 2018 but has had fairly significant pain since that time her symptoms have not changed much.  She also has had chiropractic.  We discussed options of injections,  further therapy, medications.    2.  Recommend bilateral L4-5 transforaminal epidural steroid injection with Dr. Almaguer.  This is been ordered.    3.  Recommend trial of gentle MedX Physical Therapy for lumbar core strengthening stabilization.    4.  She was given meloxicam at last visit did not yet pick it up.  This would be a good trial to take with her on her trip to help with pain.    5.  If she is unable to have the injection prior to leaving on her trip I can provide a Medrol Dosepak for her to take with her.    6.  Follow-up at her earliest convenience for the injection.    It was our pleasure caring for your patient today, if there any questions or concerns please do not hesitate to contact us.      Subjective:   Patient ID: Sonya Blackmon is a 46 year old female.    History of Present Illness:Patient presents for follow-up of low back pain and right lower extremity radicular pain.  Most of her pain is in the low back right greater than left at the lumbosacral junction radiating into the right gluteal region with numbness but this is worse with any lifting bending physical activity yard work.  Pain is better with ice and rest.  Has not tried meloxicam.  I did review old records and last physical therapy was in August 2018 but she has had pain fairly consistently since that time but waxing waning in intensity.  Pain is a 10/10 at worst 5/10 today 2/10 at best.  Feels very stiff.  Has had MRI since last visit [facility].    Imaging: MRI report and images were personally reviewed and discussed with the patient.  A plastic model was utilized during the discussion.  MRI of the Lumbar spine personally reviewed from [facility].  Mild disc bulge L4-5 slightly eccentric to the right with lateral recess stenosis but no impingement of the nerves.  Mild foraminal stenosis.  No central stenosis.  Mild facet arthropathy L4-5 and L5-S1.  There is an annular tear at L4-5    Review of Systems: Pertinent  positives: Weakness in the back numbness and tingling right gluteal region.  Headaches pertinent negatives:   No bowel or bladder incontinence.  No urinary retention.  No fevers, unintentional weight loss, balance changes,   frequent falling, difficulty swallowing, or coordination difficulties.  All others reviewed are negative.           Past Medical History:   Diagnosis Date     Cholestasis of pregnancy 8/7/2009    Presumed secondary to sx and elevated LFTs, bile acids normal  Plan 2/week BPP and IOL 37-38 wks, no amnio--all per [facility]     NO ACTIVE PROBLEMS        The following portions of the patient's history were reviewed and updated as appropriate: allergies, current medications, past family history, past medical history, past social history, past surgical history and problem list.           Objective:   Physical Exam:    /58   Pulse 56   There is no height or weight on file to calculate BMI.      General: Alert and oriented with normal affect. Attention, knowledge, memory, and language are intact. No acute distress.   Eyes: Sclerae are clear.  Respirations: Unlabored. CV: No lower extremity edema.  Skin: No rashes seen.    Gait:  Nonantalgic  Negative straight leg raise bilaterally.  Mild positive thigh thrust and Demi's test for back pain at the lumbosacral junction.  Negative Gaenslen's on the right.  Sensation is intact to light touch throughout the  lower extremities.  Reflexes are 2+ and symmetric in the biceps triceps and brachioradialis with negative Hoffmans.      Manual muscle testing reveals:  Right /Left out of 5     5/5 hip flexors  5/5 knee flexors  5/5 knee extensors  5/5 ankle plantar flexors  5/5 ankle dorsiflexors  5/5  EHL

## 2022-05-20 NOTE — PATIENT INSTRUCTIONS
A Lumbar epidural has been ordered today. Please schedule this injection at least  2 weeks from now to allow time for insurance prior authorization. On the day of your injection, you cannot be sick or taking antibiotics. If you become sick and are prescribed, please call the clinic so your injection can be rescheduled for once you have completed your antibiotics. You will need to bring a  with you for your injection. If you have any questions or concerns prior to your injection, please do not hesitate to call the nurse navigation line at [phone].   2. A physical therapy order was provided for you today.  You  will be contacted by physical therapy.  If nobody contacts you within 3 to 5 days, please contact the clinic at [phone].  It will be very important for you to do your physical therapy exercises on a regular basis to decrease your pain and prevent future pain flares.     3. Try the meloxicam that ws ordered at last visit.  It might be helpful for pain on your trip.

## 2022-05-20 NOTE — LETTER
5/20/2022         RE: Sonya Blackmon  [address] [id number]        Dear Colleague,    Thank you for referring your patient, Sonya Blackmon, to the [facility]. Please see a copy of my visit note below.    Assessment/Plan:      Sonya was seen today for back pain.    Diagnoses and all orders for this visit:    Lumbar spine pain  -     PAIN Transforaminal NANCY Inj Lumbosacral One Level Bilateral; Future  -     Physical Therapy Referral; Future    Lumbar disc herniation  -     PAIN Transforaminal NANCY Inj Lumbosacral One Level Bilateral; Future  -     Physical Therapy Referral; Future    Annular tear of lumbar disc  -     PAIN Transforaminal NANCY Inj Lumbosacral One Level Bilateral; Future  -     Physical Therapy Referral; Future    Lumbar radicular pain  -     PAIN Transforaminal NANCY Inj Lumbosacral One Level Bilateral; Future  -     Physical Therapy Referral; Future    Stenosis of lateral recess of lumbar spine  -     Physical Therapy Referral; Future    Myofascial pain  -     Physical Therapy Referral; Future         Assessment: Pleasant 46 year old female otherwise healthy with:     1.  Chronic 5-year history of low back pain at the lumbosacral junction.  She has a history of central disc herniation at L4-5 with a right paracentral component in 2019 which symptomatically had some mild improvement for short time after interlaminar lumbar epidural L4-5 interlaminar in 2019 at [facility], chiropractic and physical therapy but has returned.  Currently she has low back pain with mild disc bulge at L4-5 with an annular tear and small central disc herniation.  There is mild right lateral recess stenosis and mild facet arthropathy at L4-5 and L5-S1.  Her last physical therapy was in August 2018 and her pain is persisted since that time.     2.  Myofascial pain lumbar spine.    Discussion:    1 we discussed the diagnosis and treatment options.  She is  going on a trip at the end of June and would like some relief prior to that.  Her last therapy was in August 2018 but has had fairly significant pain since that time her symptoms have not changed much.  She also has had chiropractic.  We discussed options of injections, further therapy, medications.    2.  Recommend bilateral L4-5 transforaminal epidural steroid injection with Dr. Almaguer.  This is been ordered.    3.  Recommend trial of gentle MedX Physical Therapy for lumbar core strengthening stabilization.    4.  She was given meloxicam at last visit did not yet pick it up.  This would be a good trial to take with her on her trip to help with pain.    5.  If she is unable to have the injection prior to leaving on her trip I can provide a Medrol Dosepak for her to take with her.    6.  Follow-up at her earliest convenience for the injection.    It was our pleasure caring for your patient today, if there any questions or concerns please do not hesitate to contact us.      Subjective:   Patient ID: Sonya Blackmon is a 46 year old female.    History of Present Illness:Patient presents for follow-up of low back pain and right lower extremity radicular pain.  Most of her pain is in the low back right greater than left at the lumbosacral junction radiating into the right gluteal region with numbness but this is worse with any lifting bending physical activity yard work.  Pain is better with ice and rest.  Has not tried meloxicam.  I did review old records and last physical therapy was in August 2018 but she has had pain fairly consistently since that time but waxing waning in intensity.  Pain is a 10/10 at worst 5/10 today 2/10 at best.  Feels very stiff.  Has had MRI since last visit [facility].    Imaging: MRI report and images were personally reviewed and discussed with the patient.  A plastic model was utilized during the discussion.  MRI of the Lumbar spine personally reviewed from [facility].   Mild disc bulge L4-5 slightly eccentric to the right with lateral recess stenosis but no impingement of the nerves.  Mild foraminal stenosis.  No central stenosis.  Mild facet arthropathy L4-5 and L5-S1.  There is an annular tear at L4-5    Review of Systems: Pertinent positives: Weakness in the back numbness and tingling right gluteal region.  Headaches pertinent negatives:   No bowel or bladder incontinence.  No urinary retention.  No fevers, unintentional weight loss, balance changes,   frequent falling, difficulty swallowing, or coordination difficulties.  All others reviewed are negative.           Past Medical History:   Diagnosis Date     Cholestasis of pregnancy 8/7/2009    Presumed secondary to sx and elevated LFTs, bile acids normal  Plan 2/week BPP and IOL 37-38 wks, no amnio--all per [facility] rec     NO ACTIVE PROBLEMS        The following portions of the patient's history were reviewed and updated as appropriate: allergies, current medications, past family history, past medical history, past social history, past surgical history and problem list.           Objective:   Physical Exam:    /58   Pulse 56   There is no height or weight on file to calculate BMI.      General: Alert and oriented with normal affect. Attention, knowledge, memory, and language are intact. No acute distress.   Eyes: Sclerae are clear.  Respirations: Unlabored. CV: No lower extremity edema.  Skin: No rashes seen.    Gait:  Nonantalgic  Negative straight leg raise bilaterally.  Mild positive thigh thrust and Demi's test for back pain at the lumbosacral junction.  Negative Gaenslen's on the right.  Sensation is intact to light touch throughout the  lower extremities.  Reflexes are 2+ and symmetric in the biceps triceps and brachioradialis with negative Hoffmans.      Manual muscle testing reveals:  Right /Left out of 5     5/5 hip flexors  5/5 knee flexors  5/5 knee extensors  5/5 ankle plantar flexors  5/5 ankle  dorsiflexors  5/5  L        Again, thank you for allowing me to participate in the care of your patient.        Sincerely,        Rodriguez Leo, DO

## 2022-06-03 ENCOUNTER — RADIOLOGY INJECTION OFFICE VISIT (OUTPATIENT)
Dept: PHYSICAL MEDICINE AND REHAB | Facility: CLINIC | Age: 47
End: 2022-06-03
Attending: PHYSICAL MEDICINE & REHABILITATION
Payer: COMMERCIAL

## 2022-06-03 VITALS
OXYGEN SATURATION: 100 % | SYSTOLIC BLOOD PRESSURE: 116 MMHG | TEMPERATURE: 98.1 F | RESPIRATION RATE: 16 BRPM | HEART RATE: 72 BPM | DIASTOLIC BLOOD PRESSURE: 74 MMHG

## 2022-06-03 DIAGNOSIS — M54.16 LUMBAR RADICULAR PAIN: ICD-10-CM

## 2022-06-03 DIAGNOSIS — M51.369 ANNULAR TEAR OF LUMBAR DISC: ICD-10-CM

## 2022-06-03 DIAGNOSIS — M51.26 LUMBAR DISC HERNIATION: ICD-10-CM

## 2022-06-03 DIAGNOSIS — M54.50 LUMBAR SPINE PAIN: ICD-10-CM

## 2022-06-03 PROCEDURE — 64483 NJX AA&/STRD TFRM EPI L/S 1: CPT | Mod: 50 | Performed by: PAIN MEDICINE

## 2022-06-03 RX ORDER — DEXAMETHASONE SODIUM PHOSPHATE 10 MG/ML
INJECTION, SOLUTION INTRAMUSCULAR; INTRAVENOUS
Status: COMPLETED | OUTPATIENT
Start: 2022-06-03 | End: 2022-06-03

## 2022-06-03 RX ORDER — LIDOCAINE HYDROCHLORIDE 10 MG/ML
INJECTION, SOLUTION EPIDURAL; INFILTRATION; INTRACAUDAL; PERINEURAL
Status: COMPLETED | OUTPATIENT
Start: 2022-06-03 | End: 2022-06-03

## 2022-06-03 RX ADMIN — DEXAMETHASONE SODIUM PHOSPHATE 10 MG: 10 INJECTION, SOLUTION INTRAMUSCULAR; INTRAVENOUS at 08:21

## 2022-06-03 RX ADMIN — LIDOCAINE HYDROCHLORIDE 4 ML: 10 INJECTION, SOLUTION EPIDURAL; INFILTRATION; INTRACAUDAL; PERINEURAL at 08:20

## 2022-06-03 ASSESSMENT — PAIN SCALES - GENERAL
PAINLEVEL: MODERATE PAIN (4)
PAINLEVEL: NO PAIN (0)

## 2022-06-03 NOTE — PATIENT INSTRUCTIONS
Follow-up visit with Dr. Leo in 2 weeks to discuss injection outcome and determine care plan going forward.       DISCHARGE INSTRUCTIONS    During office hours (8:00 a.m.- 4:00 p.m.) questions or concerns may be answered  by calling Spine Center Navigation Nurses at  [phone].  Messages received after hours will be returned the following business day.      In the case of an emergency, please dial 911 or seek assistance at the nearest Emergency Room/Urgent Care facility.     All Patients:    You may experience an increase in your symptoms for the first 2 days (It may take anywhere between 2 days- 2 weeks for the steroid to have maximum effect).    You may use ice on the injection site, as frequently as 20 minutes each hour if needed.    You may take your pain medicine.    You may continue taking your regular medication after your injection. If you have had a Medial Branch Block you may resume pain medication once your pain diary is completed.    You may shower. No swimming, tub bath or hot tub for 48 hours.  You may remove your bandaid/bandage as soon as you are home.    You may resume light activities, as tolerated.    Resume your usual diet as tolerated.    It is strongly advised that you do not drive for 1-3 hours post injection.    If you have had oral sedation:  Do not drive for 8 hours post injection.      If you have had IV sedation:  Do not drive for 24 hours post injection.  Do not operate hazardous machinery or make important personal/business decisions for 24 hours.      POSSIBLE STEROID SIDE EFFECTS (If steroid/cortisone was used for your procedure)    -If you experience these symptoms, it should only last for a short period    Swelling of the legs              Skin redness (flushing)     Mouth (oral) irritation   Blood sugar (glucose) levels            Sweats                    Mood changes  Headache  Sleeplessness  Weakened immune system for up to 14 days, which could increase the risk of  kiya the COVID-19 virus and/or experiencing more severe symptoms of the disease, if exposed.  Decreased effectiveness of the flu vaccine if given within 2 weeks of the steroid.         POSSIBLE PROCEDURE SIDE EFFECTS  -Call the Spine Center if you are concerned  Increased Pain           Increased numbness/tingling      Nausea/Vomiting          Bruising/bleeding at site      Redness or swelling                                              Difficulty walking      Weakness           Fever greater than 100.5    *In the event of a severe headache after an epidural steroid injection that is relieved by lying down, please call the [facility] to speak with a clinical staff member*

## 2022-06-13 ENCOUNTER — HOSPITAL ENCOUNTER (OUTPATIENT)
Dept: PHYSICAL THERAPY | Facility: CLINIC | Age: 47
Discharge: HOME | End: 2022-06-13
Attending: PHYSICAL MEDICINE & REHABILITATION
Payer: COMMERCIAL

## 2022-06-13 DIAGNOSIS — M54.50 LUMBAR SPINE PAIN: ICD-10-CM

## 2022-06-13 DIAGNOSIS — M48.061 STENOSIS OF LATERAL RECESS OF LUMBAR SPINE: ICD-10-CM

## 2022-06-13 DIAGNOSIS — M51.26 LUMBAR DISC HERNIATION: ICD-10-CM

## 2022-06-13 DIAGNOSIS — M54.16 LUMBAR RADICULAR PAIN: ICD-10-CM

## 2022-06-13 DIAGNOSIS — M51.369 ANNULAR TEAR OF LUMBAR DISC: ICD-10-CM

## 2022-06-13 DIAGNOSIS — M79.18 MYOFASCIAL PAIN: ICD-10-CM

## 2022-06-13 PROCEDURE — 97161 PT EVAL LOW COMPLEX 20 MIN: CPT | Mod: GP | Performed by: PHYSICAL THERAPIST

## 2022-06-13 PROCEDURE — 97110 THERAPEUTIC EXERCISES: CPT | Mod: GP | Performed by: PHYSICAL THERAPIST

## 2022-06-13 NOTE — PROGRESS NOTES
Lumbar MedX Initial testing    AROM (full=  0-72  lumbar) 0-42   Max Extension Torque  270#   Flex: ext ratio (ideal 1.4:1) 2.37:1     Date 6/13/2022   Lumbar Parameters    Top Dead Center (TDC) 18   Counterbalance (CB) 487   Seat Pad 1   Femur Restraint 5   Week/Visit    Enter Week/Visit # 1/1   Weight (lbs) 270# Max   Reps (#) na   Time na   ROM (degrees) 0-42   Pain No inc, maybe better in leg   Flex:Ext ratio 2.37:1     Date 6/13/2022   Exercise    Treadmill  X 5 min   Rotary torso 90 seconds Next session   SLR sliders X 10   Slump sliders X 10   Piriformis stretch X 30 seconds   Prone press ups/MARCELINA X 10                             Josh Mon, PT

## 2022-06-13 NOTE — PROGRESS NOTES
06/13/22 0700   General Information   Type of Visit Initial OP Ortho PT Evaluation   Referring Physician Dr. Rodriguez Leo, DO   Orders Evaluate and Treat   Orders Comment Gentle Medx   Certification Required? No   Medical Diagnosis Lumbar disc herniation  Lumbar radicular pain  Lumbar spine pain  Stenosis of lateral recess of lumbar spine  Myofascial pain  Annular tear of lumbar disc   Surgical/Medical history reviewed Yes   Precautions/Limitations no known precautions/limitations   Body Part(s)   Body Part(s) Lumbar Spine/SI   Presentation and Etiology   Pertinent history of current problem (include personal factors and/or comorbidities that impact the POC) Pain Started about 5 years ago, started slowly, pain in back with spasms intermittently based on activity. In 2018, she bent over on a trip and had a significant flare of pain at the time. She went through injection, PT, and chiropractic in 2018 with some benefit. Pain has been intermittent since then. She just had another B L4-5 TFESI on 6/3/22, feels like this went pretty well and seems to be helping. She is getting more tingling left leg down to the ankle behind the leg. She report tingling in bilateral hands that is new. No significant weakness. Pain Described as low back R>L belt region radiating to left glut. Worse with getting dressed in the morning, lifting, bending, yard work, physical activity, sitting too long and then getting upright. Better with laying down, resting, icing, medication. Enjoys walking, biking, outdoor activities. Avoids kayaking because of pain.   Fall Risk Screen   Fall screen completed by PT   Have you fallen 2 or more times in the past year? No   Have you fallen and had an injury in the past year? No   Is patient a fall risk? No   Abuse Screen (yes response referral indicated)   Feels Unsafe at Home or Work/School no   Feels Threatened by Someone no   Does Anyone Try to Keep You From Having Contact with Others or Doing Things  Outside Your Home? no   Physical Signs of Abuse Present no   Patient needs abuse support services and resources No   System Outcome Measures   Outcome Measures   (CHANTEL 36%)   Lumbar Spine/SI Objective Findings   Gait/Locomotion Normal   Hamstring Flexibility dec   Quadricep Flexibility dec with pain in back   Piriformis Flexibility mild dec   Flexion ROM 14 cm   Extension ROM WNL feels good   Right Side Bending ROM WNL   Left Side Bending ROM WNL   Pelvic Screen - SI pain provocation testing   Hip Screen WNL   Lumbar/Hip/Knee/Foot Strength Comments 5/5 throughout LE myotome screen   SLR 80/70+   Holger Test NT 2/2 dress pants   Ely Test +/+   Repeated Extension Prone no current tingling   Segmental Mobility mild hypomobility lower lumbar   Palpation tenderness L>R lumbar paraspinals   Slump Test -/+   Sensation Testing WNL to lt touch sensation screen   Planned Therapy Interventions   Planned Therapy Interventions joint mobilization;manual therapy;neuromuscular re-education;ROM;strengthening;stretching   Planned Therapy Interventions Comment Medx   Planned Modality Interventions   Planned Modality Interventions TENS;Traction   Clinical Impression   Criteria for Skilled Therapeutic Interventions Met yes, treatment indicated   PT Diagnosis low back pain, left lumbar radiculitis, + LE neural tension   Clinical Presentation Stable/Uncomplicated   Clinical Decision Making (Complexity) Low complexity   Therapy Frequency 2 times/Week   Predicted Duration of Therapy Intervention (days/wks) up to 16 visitsfor Medx   Risk & Benefits of therapy have been explained Yes   Patient, Family & other staff in agreement with plan of care Yes   Clinical Impression Comments Patient is a 46 year old female seen in PT for initial evaluation of low back pain that has bee intermittent over the last 5 years. The patients pain is primarily low back with left leg tingling down the posterior leg to the ankle. She is having difficulty with  getting dressed, bending, lifting, yard work, sitting too long, recreation, 2/2 pain. With examination, the patient demonstrates + LE neural tension on left, tightness in quadriceps/hip flexor with ely's test, mild lower lumbar hypomobility. She does have L4-5 disc herniation on MRI and is likely contributing to her pain presentation. She will likely benefit from skilled PT to improve her mobility, strength, pain, and overall function.   Education Assessment   Preferred Learning Style Listening;Reading;Demonstration;Pictures/video   Barriers to Learning No barriers   ORTHO GOALS   PT Ortho Eval Goals 1;2;3;4   Ortho Goal 1   Goal Description be able to return to recreation without increase in pain in 8 weeks:   Ortho Goal 2   Goal Description Patient will be able to do yardwork without increased pain in 10 weeks   Ortho Goal 3   Goal Description Patient will be able to clean house without increased pain in 8 weeks:   Ortho Goal 4   Goal Progress -   Total Evaluation Time   PT Spencer Low Complexity Minutes (81900) 25     Josh Mon, PT

## 2022-06-19 ENCOUNTER — HEALTH MAINTENANCE LETTER (OUTPATIENT)
Age: 47
End: 2022-06-19

## 2022-07-11 ENCOUNTER — HOSPITAL ENCOUNTER (OUTPATIENT)
Dept: PHYSICAL THERAPY | Facility: CLINIC | Age: 47
Discharge: HOME | End: 2022-07-11
Payer: COMMERCIAL

## 2022-07-11 DIAGNOSIS — M51.369 ANNULAR TEAR OF LUMBAR DISC: ICD-10-CM

## 2022-07-11 DIAGNOSIS — M79.18 MYOFASCIAL PAIN: ICD-10-CM

## 2022-07-11 DIAGNOSIS — M51.26 LUMBAR DISC HERNIATION: Primary | ICD-10-CM

## 2022-07-11 DIAGNOSIS — M48.061 STENOSIS OF LATERAL RECESS OF LUMBAR SPINE: ICD-10-CM

## 2022-07-11 DIAGNOSIS — M54.16 LUMBAR RADICULAR PAIN: ICD-10-CM

## 2022-07-11 DIAGNOSIS — M54.50 LUMBAR SPINE PAIN: ICD-10-CM

## 2022-07-11 PROCEDURE — 97110 THERAPEUTIC EXERCISES: CPT | Mod: GP

## 2022-07-11 NOTE — PROGRESS NOTES
Subjective:   Patient has been doing well over the last few weeks. She was on a trip which went well overall. She is worried about going back to work now that she is back with sitting more often. Her left leg is a bit more tingling/numb over the last few weeks as well.     Assessment:   Patient returns to PT following her 3 week vacation to [place]. She had been moderately compliant with the HEP as she was very busy with traveling, but she feels her symptoms are about the same. Tolerated dynamic exercise with the MedX OK today - fatigued quickly. She would benefit from continued PT services.      Lumbar MedX Initial testing    AROM (full=  0-72  lumbar) 0-42   Max Extension Torque  270#   Flex: ext ratio (ideal 1.4:1) 2.37:1     Date 7/11/2022 6/13/2022   Lumbar Parameters     Top Dead Center (TDC) 18 18   Counterbalance (CB) 487 487   Seat Pad 1 1   Femur Restraint 5 5   Week/Visit     Enter Week/Visit # 1/2 1/1   Weight (lbs) 130# 270# Max   Reps (#) 11 na   Time 72 na   ROM (degrees) 0-42 0-42   Pain No increase in pain; fatigue and stretch to the ribs No inc, maybe better in leg   Flex:Ext ratio  2.37:1     Date 7/11/2022 6/13/2022   Exercise     Treadmill  X 5 min X 5 min   Rotary torso 90 seconds 30# to R  Next session   SLR sliders  X 10   Slump sliders X 15 L  X 10   Piriformis stretch X 30 sec X 30 seconds   Prone press ups/MARCELINA X 15 X 10   Supine PPTs,  X 10, 5 sec                                Scott Parmer, PT

## 2022-07-13 ENCOUNTER — HOSPITAL ENCOUNTER (OUTPATIENT)
Dept: PHYSICAL THERAPY | Facility: CLINIC | Age: 47
Discharge: HOME | End: 2022-07-13
Payer: COMMERCIAL

## 2022-07-13 DIAGNOSIS — M48.061 STENOSIS OF LATERAL RECESS OF LUMBAR SPINE: ICD-10-CM

## 2022-07-13 DIAGNOSIS — M54.50 LUMBAR SPINE PAIN: ICD-10-CM

## 2022-07-13 DIAGNOSIS — M51.26 LUMBAR DISC HERNIATION: Primary | ICD-10-CM

## 2022-07-13 DIAGNOSIS — M79.18 MYOFASCIAL PAIN: ICD-10-CM

## 2022-07-13 DIAGNOSIS — M51.369 ANNULAR TEAR OF LUMBAR DISC: ICD-10-CM

## 2022-07-13 DIAGNOSIS — M54.16 LUMBAR RADICULAR PAIN: ICD-10-CM

## 2022-07-13 PROCEDURE — 97110 THERAPEUTIC EXERCISES: CPT | Mod: GP | Performed by: PHYSICAL THERAPIST

## 2022-07-13 NOTE — PROGRESS NOTES
Subjective:   Left leg had more of tingling while on vacation. Does feel that it got better when she started moving more. Feeling tingling in foot right now, back has been feeling well actually.    Assessment:   Patient seen for 2nd f/u visit Medx. She did better with Medx, appropriate reps to fatigue after lightening weight from last session, will plan to build strength from here. PT did add strengthening for HEP today as well. She does report improvements in symptoms when performing the exercises. She would benefit from continued PT services.      Lumbar MedX Initial testing    AROM (full=  0-72  lumbar) 0-42   Max Extension Torque  270#   Flex: ext ratio (ideal 1.4:1) 2.37:1     Date 7/13/2022 7/11/2022 6/13/2022   Lumbar Parameters      Top Dead Center (TDC) 18 18 18   Counterbalance (CB) 487 487 487   Seat Pad 1 1 1   Femur Restraint 5 5 5   Week/Visit      Enter Week/Visit # 2/1 1/2 1/1   Weight (lbs) 120# 130# 270# Max   Reps (#) 15 11 na   Time 91 72 na   ROM (degrees) 0-42 0-42 0-42   Pain fatigue No increase in pain; fatigue and stretch to the ribs No inc, maybe better in leg   Flex:Ext ratio   2.37:1     Date 7/13/2022 7/11/2022 6/13/2022   Exercise      Treadmill  X 5 min  X 5 min X 5 min   Rotary torso 90 seconds 32#'s to L 30# to R  Next session   SLR sliders   X 10   Slump sliders  X 15 L  X 10   Piriformis stretch  X 30 sec X 30 seconds   Prone press ups/MARCELINA  X 15 X 10   Supine PPTs,   X 10, 5 sec     Bridging X 10      TA SLR  X 10      SL hip abduction X 10                   Josh Mon, PT

## 2022-07-20 ENCOUNTER — HOSPITAL ENCOUNTER (OUTPATIENT)
Dept: PHYSICAL THERAPY | Facility: CLINIC | Age: 47
Discharge: HOME | End: 2022-07-20
Payer: COMMERCIAL

## 2022-07-20 DIAGNOSIS — M48.061 STENOSIS OF LATERAL RECESS OF LUMBAR SPINE: ICD-10-CM

## 2022-07-20 DIAGNOSIS — M79.18 MYOFASCIAL PAIN: ICD-10-CM

## 2022-07-20 DIAGNOSIS — M51.369 ANNULAR TEAR OF LUMBAR DISC: ICD-10-CM

## 2022-07-20 DIAGNOSIS — M54.16 LUMBAR RADICULAR PAIN: ICD-10-CM

## 2022-07-20 DIAGNOSIS — M51.26 LUMBAR DISC HERNIATION: Primary | ICD-10-CM

## 2022-07-20 DIAGNOSIS — M54.50 LUMBAR SPINE PAIN: ICD-10-CM

## 2022-07-20 PROCEDURE — 97110 THERAPEUTIC EXERCISES: CPT | Mod: GP | Performed by: PHYSICAL THERAPIST

## 2022-07-20 NOTE — PROGRESS NOTES
Subjective:   Things have been pretty good, not bad, it is a little better, just a touch of tingling in the foot this morning.     Assessment:   Patient seen for 3rd f/u visit Medx. Patient doing well with Medx at this point, feeling she is improving strength and symptoms, though tingling is still intermittent in LE, better when she is being active. Trial of reformer exercises in session today for strengthening which she enjoyed.  She would benefit from continued PT services.      Lumbar MedX Initial testing    AROM (full=  0-72  lumbar) 0-42   Max Extension Torque  270#   Flex: ext ratio (ideal 1.4:1) 2.37:1     Date 7/20/2022 7/13/2022 7/11/2022 6/13/2022   Lumbar Parameters       Top Dead Center (TDC) 18 18 18 18   Counterbalance (CB) 487 487 487 487   Seat Pad 1 1 1 1   Femur Restraint 5 5 5 5   Week/Visit       Enter Week/Visit # 2/2 2/1 1/2 1/1   Weight (lbs) 123# 120# 130# 270# Max   Reps (#) 17 15 11 na   Time 134 91 72 na   ROM (degrees) 0-42 0-42 0-42 0-42   Pain  fatigue No increase in pain; fatigue and stretch to the ribs No inc, maybe better in leg   Flex:Ext ratio    2.37:1     Date 7/20/2022 7/13/2022 7/11/2022 6/13/2022   Exercise       Treadmill  X 5 min  X 5 min  X 5 min X 5 min   Rotary torso 90 seconds 34#'s to R 32#'s to L 30# to R  Next session   SLR sliders    X 10   Slump sliders   X 15 L  X 10   Piriformis stretch X 30 seconds  X 30 sec X 30 seconds   Prone press ups/MARCELINA   X 15 X 10   Supine PPTs,    X 10, 5 sec     Bridging  X 10      TA SLR   X 10      SL hip abduction  X 10     Reformer leg press X 15 all springs DL  X 10 all springs SL      Reformer Bridges X 10 all springs      Reformer pulldowns X 10 2R      Reformer calf press  X 10 all springs DL        Josh Mon, PT

## 2022-08-04 ENCOUNTER — HOSPITAL ENCOUNTER (OUTPATIENT)
Dept: PHYSICAL THERAPY | Facility: CLINIC | Age: 47
Discharge: HOME | End: 2022-08-04
Payer: COMMERCIAL

## 2022-08-04 DIAGNOSIS — M54.50 LUMBAR SPINE PAIN: ICD-10-CM

## 2022-08-04 DIAGNOSIS — M51.369 ANNULAR TEAR OF LUMBAR DISC: ICD-10-CM

## 2022-08-04 DIAGNOSIS — M48.061 STENOSIS OF LATERAL RECESS OF LUMBAR SPINE: ICD-10-CM

## 2022-08-04 DIAGNOSIS — M51.26 LUMBAR DISC HERNIATION: Primary | ICD-10-CM

## 2022-08-04 DIAGNOSIS — M54.16 LUMBAR RADICULAR PAIN: ICD-10-CM

## 2022-08-04 DIAGNOSIS — M79.18 MYOFASCIAL PAIN: ICD-10-CM

## 2022-08-04 PROCEDURE — 97110 THERAPEUTIC EXERCISES: CPT | Mod: GP

## 2022-08-04 NOTE — PROGRESS NOTES
Subjective:   Things have been good overall, things have been improving. [place] She did Jet Ski which made it a little sore. Slight soreness today, but better than it was earlier in the week.     Assessment:   Patient seen for 4th f/u visit Medx. Patient doing well with Medx at this point, feeling she is improving strength and symptoms, though tingling is still intermittent in LE, better when she is being active. Continued reformer exercises in session today for strengthening which she enjoyed.  She would benefit from continued PT services.      Lumbar MedX Initial testing    AROM (full=  0-72  lumbar) 0-42   Max Extension Torque  270#   Flex: ext ratio (ideal 1.4:1) 2.37:1     Date 8/4/2022 7/20/2022 7/13/2022 7/11/2022 6/13/2022   Lumbar Parameters        Top Dead Center (TDC) 18 18 18 18 18   Counterbalance (CB) 487 487 487 487 487   Seat Pad 1 1 1 1 1   Femur Restraint 5 5 5 5 5   Week/Visit        Enter Week/Visit # 3/1 2/2 2/1 1/2 1/1   Weight (lbs) 125# 123# 120# 130# 270# Max   Reps (#) 15 17 15 11 na   Time 96 134 91 72 na   ROM (degrees) 0-42 0-42 0-42 0-42 0-42   Pain   fatigue No increase in pain; fatigue and stretch to the ribs No inc, maybe better in leg   Flex:Ext ratio     2.37:1     Date 8/4/2022 7/20/2022 7/13/2022 7/11/2022 6/13/2022   Exercise        Treadmill  X 5 min  X 5 min  X 5 min  X 5 min X 5 min   Rotary torso 90 seconds 32#'s to R (this gave her some soreness last visit)  34#'s to R 32#'s to L 30# to R  Next session   SLR sliders     X 10   Slump sliders    X 15 L  X 10   Piriformis stretch  X 30 seconds  X 30 sec X 30 seconds   Prone press ups/MARCELINA    X 15 X 10   Supine PPTs,     X 10, 5 sec     Bridging   X 10      TA SLR    X 10      SL hip abduction   X 10     Reformer leg press X 15 all springs DL  X 10 all springs SL X 15 all springs DL  X 10 all springs SL      Reformer Bridges X 12 all springs X 10 all springs      Reformer  pulldowns X 10 2R X 10 2R      Reformer calf press  X 15 all springs DL X 10 all springs DL      Reformer seated ab rotations X 10 1R         Scott Parmer, PT, DPT

## 2022-08-12 ENCOUNTER — HOSPITAL ENCOUNTER (OUTPATIENT)
Dept: PHYSICAL THERAPY | Facility: CLINIC | Age: 47
Discharge: HOME | End: 2022-08-12
Payer: COMMERCIAL

## 2022-08-12 DIAGNOSIS — M51.369 ANNULAR TEAR OF LUMBAR DISC: ICD-10-CM

## 2022-08-12 DIAGNOSIS — M54.50 LUMBAR SPINE PAIN: ICD-10-CM

## 2022-08-12 DIAGNOSIS — M51.26 LUMBAR DISC HERNIATION: Primary | ICD-10-CM

## 2022-08-12 DIAGNOSIS — M79.18 MYOFASCIAL PAIN: ICD-10-CM

## 2022-08-12 DIAGNOSIS — M48.061 STENOSIS OF LATERAL RECESS OF LUMBAR SPINE: ICD-10-CM

## 2022-08-12 DIAGNOSIS — M54.16 LUMBAR RADICULAR PAIN: ICD-10-CM

## 2022-08-12 PROCEDURE — 97110 THERAPEUTIC EXERCISES: CPT | Mod: GP | Performed by: PHYSICAL THERAPIST

## 2022-08-12 NOTE — PROGRESS NOTES
Subjective:   Has been going well overall! has not had really any of the leg pains!    Assessment:   Patient seen for 5th f/u visit Medx. Patient doing well with Medx at this point, feeling she is improving strength and symptoms, much less often discomfort in the leg! She feels function is going pretty well, still hesitant about lifting. Continued reformer exercises in session today for strengthening which she enjoyed. Progressed HEP with home rotation strengthening as well. She would benefit from continued PT services.      Lumbar MedX Initial testing    AROM (full=  0-72  lumbar) 0-42   Max Extension Torque  270#   Flex: ext ratio (ideal 1.4:1) 2.37:1     Date 8/12/2022 8/4/2022 7/20/2022 7/13/2022 7/11/2022 6/13/2022   Lumbar Parameters         Top Dead Center (TDC) 18 18 18 18 18 18   Counterbalance (CB) 487 487 [phone] 487   Seat Pad 1 1 1 1 1 1   Femur Restraint 5 5 5 5 5 5   Week/Visit         Enter Week/Visit # 3/2 3/1 2/2 2/1 1/2 1/1   Weight (lbs) 127# 125# 123# 120# 130# 270# Max   Reps (#) 15 15 17 15 11 na   Time 94 96 134 91 72 na   ROM (degrees) 0-42 0-42 0-42 0-42 0-42 0-42   Pain    fatigue No increase in pain; fatigue and stretch to the ribs No inc, maybe better in leg   Flex:Ext ratio      2.37:1     Date 8/12/2022 8/4/2022 7/20/2022 7/13/2022 7/11/2022 6/13/2022   Exercise         Treadmill  X 5 min  X 5 min  X 5 min  X 5 min  X 5 min X 5 min   Rotary torso 90 seconds 32#'s to L 32#'s to R (this gave her some soreness last visit)  34#'s to R 32#'s to L 30# to R  Next session   SLR sliders      X 10   Slump sliders     X 15 L  X 10   Piriformis stretch   X 30 seconds  X 30 sec X 30 seconds   Prone press ups/MARCELINA     X 15 X 10   Supine PPTs,      X 10, 5 sec     Bridging    X 10      TA SLR     X 10      SL hip abduction    X 10     Reformer leg press X 15 all springs DL  X 10 all springs SL X 15 all springs DL  X 10 all springs SL X 15 all springs DL  X 10 all springs SL      Reformer Bridges X  12 all springs X 12 all springs X 10 all springs      Reformer pulldowns X 10 2R X 10 2R X 10 2R      Reformer calf press   X 15 all springs DL X 10 all springs DL      Reformer seated ab rotations  X 10 1R       Banded trunk rotations X 10 level3 band        Palloff Press X 10 level 3 band          Josh Mon, PT

## 2022-08-14 ENCOUNTER — HEALTH MAINTENANCE LETTER (OUTPATIENT)
Age: 47
End: 2022-08-14

## 2022-09-22 ENCOUNTER — TELEPHONE (OUTPATIENT)
Dept: OBGYN | Facility: CLINIC | Age: 47
End: 2022-09-22

## 2022-09-22 ENCOUNTER — ANCILLARY PROCEDURE (OUTPATIENT)
Dept: MAMMOGRAPHY | Facility: CLINIC | Age: 47
End: 2022-09-22
Payer: COMMERCIAL

## 2022-09-22 DIAGNOSIS — Z12.31 VISIT FOR SCREENING MAMMOGRAM: ICD-10-CM

## 2022-09-22 PROCEDURE — 77067 SCR MAMMO BI INCL CAD: CPT | Mod: TC | Performed by: RADIOLOGY

## 2022-09-22 NOTE — TELEPHONE ENCOUNTER
Pt called to schedule annual. Unable to get in until Nov/Dec with AO. Has concerns she considers an emergency and would like to talk about symptoms with nurse. Pt was at work and did not describe any symptoms over the phone.

## 2022-10-14 ENCOUNTER — OFFICE VISIT (OUTPATIENT)
Dept: OBGYN | Facility: CLINIC | Age: 47
End: 2022-10-14
Payer: COMMERCIAL

## 2022-10-14 VITALS
TEMPERATURE: 98.4 F | BODY MASS INDEX: 25.84 KG/M2 | HEART RATE: 87 BPM | DIASTOLIC BLOOD PRESSURE: 71 MMHG | WEIGHT: 165 LBS | SYSTOLIC BLOOD PRESSURE: 116 MMHG

## 2022-10-14 DIAGNOSIS — N88.8 NABOTHIAN CYST: Primary | ICD-10-CM

## 2022-10-14 PROCEDURE — 99212 OFFICE O/P EST SF 10 MIN: CPT | Performed by: OBSTETRICS & GYNECOLOGY

## 2022-10-14 NOTE — PROGRESS NOTES
"[facility]- OBGYN    CC: new \"fold\" palpated when checking IUD strings    S:Sonya Blackmon is a 46 year old  who presents today for vaginal concern.  Patient reports for the past 1 month she has noticed a palpable \"fold\" in the superior portion of the vagina when she went to check her IUD strings.  No other concern.    OBGYN Hx:   OB History    Para Term  AB Living   2 2 1 1 0 2   SAB IAB Ectopic Multiple Live Births   0 0 0 0 2      # Outcome Date GA Lbr Jayson/2nd Weight Sex Delivery Anes PTL Lv   2  09 36w0d  2.948 kg (6 lb 8 oz) M CS-LTranv   TRACEY      Name: Kenny Vines Term 04 41w0d  3.941 kg (8 lb 11 oz) M    TRACEY      Name: Jaspal     LMP- 10/12/22  Menses:monthly, light.  Lasts about 1 week  Sexually active with one male partner  STD Hx: none  Pap hx: 21 NILM HPV negative    PMH: none  PSH:none  Meds:none  Allergies:NKDA  SH:Denies any tobacco, alcohol, or drug use  O:   Patient Vitals for the past 24 hrs:   BP Temp Pulse Weight   10/14/22 1310 116/71 98.4  F (36.9  C) 87 74.8 kg (165 lb)   ]  Exam:  General- awake, alert, answering questions appropriately, appears comfortable  CV- regular rate  Lung- breathing comfortably on room air  - normal appearing external genitalia, normal appearing vaginal mucosa, cervix appears multiparous with prominent ectropion.  At 12 oclock nabothian cyst present.  IUD strings visualized.      A&P: Sonya Blackmon is a 46 year old  who presents today for vaginal concern.    (N88.8) Nabothian cyst  (primary encounter diagnosis)  Comment: Patient with healthy appearing cervix.  No abnormalities on vaginal mucosa visualized.  It appears there is a slight enlargement of the anterior cervix consistent with a nabothian cyst.  Likely this is the difference the patient was palpating on her string check.    Plan: Normal exam.  No follow up needed.      Kelly Headley MD        "

## 2022-11-13 NOTE — PROGRESS NOTES
SUBJECTIVE:   Sonya Blackmon is a 42 year old female who presents to clinic today for the following health issues:      Back Pain       Duration: about 1 week        Specific cause: turning/bending    Description:   Location of pain: low back middle  Character of pain: sharp  Pain radiation: not any more  New numbness or weakness in legs, not attributed to pain:  Unsure, right leg has had some tingling and her toes went numb last night    Intensity: At its worst 10/10    History:   Pain interferes with job: YES  History of back problems: recurrent self limited episodes of low back pain in the past  [place] or [place]  Sees a specialist for back pain:  No  Therapies tried without relief: none    Alleviating factors:   Improved by: medications [place] to her [place]      Precipitating factors:  Worsened by: Walking, bending, anything besides sitting or laying down    Functional and Psychosocial Screen (Sandhya STarT Back):      Not performed today          Accompanying Signs & Symptoms:  Risk of Fracture:  None  Risk of Cauda Equina:  None  Risk of Infection:  None  Risk of Cancer:  None  Risk of Ankylosing Spondylitis:  Onset at age <35, male, AND morning back stiffness. no           Problem list and histories reviewed & adjusted, as indicated.  Additional history: as documented    BP Readings from Last 3 Encounters:   07/10/18 98/70   02/10/17 103/65   01/23/17 118/66    Wt Readings from Last 3 Encounters:   07/10/18 152 lb (68.9 kg)   02/10/17 152 lb 9.6 oz (69.2 kg)   01/23/17 152 lb (68.9 kg)                    Reviewed and updated as needed this visit by clinical staff  Tobacco  Allergies  Meds  Med Hx  Surg Hx  Fam Hx  Soc Hx      Reviewed and updated as needed this visit by Provider         ROS:  Constitutional, HEENT, cardiovascular, pulmonary, GI, , musculoskeletal, neuro, skin, endocrine and psych systems are negative, except as otherwise noted.    OBJECTIVE:     BP  "98/70 (Cuff Size: Adult Regular)  Pulse 64  Temp 98.1  F (36.7  C) (Oral)  Ht 5' 6\" (1.676 m)  Wt 152 lb (68.9 kg)  BMI 24.53 kg/m2  Body mass index is 24.53 kg/(m^2).  GENERAL: healthy, alert and no distress  MS: no gross musculoskeletal defects noted, no edema  SKIN: no suspicious lesions or rashes  NEURO: Normal strength and tone, mentation intact and speech normal  Comprehensive back pain exam:  Tenderness of lumbar paraspinals , Range of motion not limited by pain, Lower extremity reflexes within normal limits bilaterally, Lower extremity sensation normal and equal on both sides and Straight leg raise negative bilaterally  PSYCH: mentation appears normal, affect normal/bright    Osteopathic Structural Exam    Thoracis--T 4-7 rotated right, Treatment HVLA/ME  Innominates--upslip on left,Treatment  ME  Sacrum--unilateral forward torsion on left, Treatment ME, cranial-sacral      Diagnostic Test Results:  none     ASSESSMENT/PLAN:     ASSESSMENT/PLAN:      ICD-10-CM    1. Acute bilateral low back pain without sciatica M54.5    2. Somatic dysfunction of thoracic region M99.02 OSTEOPATHIC MANIP,3-4 BODY REGN     OSTEOPATHIC MANIP,3-4 BODY REGN   3. Somatic dysfunction of sacral region M99.04 OSTEOPATHIC MANIP,3-4 BODY REGN     OSTEOPATHIC MANIP,3-4 BODY REGN   4. Somatic dysfunction of pelvis region M99.05 OSTEOPATHIC MANIP,3-4 BODY REGN     OSTEOPATHIC MANIP,3-4 BODY REGN     She improved with OMT.  She will do heat and the below exercises.     Patient Instructions       Passive spinal twist both sides          Figure four stretch       Do these daily and follow up in two weeks if not improved     Becca Pardo D.O.                FUTURE APPOINTMENTS:       - Follow-up for annual visit or as needed    Becca Pardo DO  [facility]" Yes

## 2022-11-19 ENCOUNTER — HEALTH MAINTENANCE LETTER (OUTPATIENT)
Age: 47
End: 2022-11-19

## 2022-12-05 ENCOUNTER — OFFICE VISIT (OUTPATIENT)
Dept: FAMILY MEDICINE | Facility: CLINIC | Age: 47
End: 2022-12-05
Payer: COMMERCIAL

## 2022-12-05 VITALS
WEIGHT: 167 LBS | HEIGHT: 67 IN | TEMPERATURE: 98.1 F | DIASTOLIC BLOOD PRESSURE: 68 MMHG | RESPIRATION RATE: 12 BRPM | OXYGEN SATURATION: 99 % | HEART RATE: 75 BPM | BODY MASS INDEX: 26.21 KG/M2 | SYSTOLIC BLOOD PRESSURE: 112 MMHG

## 2022-12-05 DIAGNOSIS — B07.0 PLANTAR WARTS: Primary | ICD-10-CM

## 2022-12-05 DIAGNOSIS — E55.9 VITAMIN D DEFICIENCY: ICD-10-CM

## 2022-12-05 LAB
BASOPHILS # BLD AUTO: 0 10E3/UL (ref 0–0.2)
BASOPHILS NFR BLD AUTO: 0 %
EOSINOPHIL # BLD AUTO: 0.1 10E3/UL (ref 0–0.7)
EOSINOPHIL NFR BLD AUTO: 1 %
ERYTHROCYTE [DISTWIDTH] IN BLOOD BY AUTOMATED COUNT: 12.2 % (ref 10–15)
HCT VFR BLD AUTO: 38.2 % (ref 35–47)
HGB BLD-MCNC: 12.5 G/DL (ref 11.7–15.7)
LYMPHOCYTES # BLD AUTO: 2.9 10E3/UL (ref 0.8–5.3)
LYMPHOCYTES NFR BLD AUTO: 35 %
MCH RBC QN AUTO: 31.7 PG (ref 26.5–33)
MCHC RBC AUTO-ENTMCNC: 32.7 G/DL (ref 31.5–36.5)
MCV RBC AUTO: 97 FL (ref 78–100)
MONOCYTES # BLD AUTO: 0.8 10E3/UL (ref 0–1.3)
MONOCYTES NFR BLD AUTO: 9 %
NEUTROPHILS # BLD AUTO: 4.6 10E3/UL (ref 1.6–8.3)
NEUTROPHILS NFR BLD AUTO: 55 %
PLATELET # BLD AUTO: 282 10E3/UL (ref 150–450)
RBC # BLD AUTO: 3.94 10E6/UL (ref 3.8–5.2)
WBC # BLD AUTO: 8.4 10E3/UL (ref 4–11)

## 2022-12-05 PROCEDURE — 99214 OFFICE O/P EST MOD 30 MIN: CPT | Performed by: FAMILY MEDICINE

## 2022-12-05 PROCEDURE — 85025 COMPLETE CBC W/AUTO DIFF WBC: CPT | Performed by: FAMILY MEDICINE

## 2022-12-05 PROCEDURE — 82306 VITAMIN D 25 HYDROXY: CPT | Performed by: FAMILY MEDICINE

## 2022-12-05 PROCEDURE — 87389 HIV-1 AG W/HIV-1&-2 AB AG IA: CPT | Performed by: FAMILY MEDICINE

## 2022-12-05 PROCEDURE — 36415 COLL VENOUS BLD VENIPUNCTURE: CPT | Performed by: FAMILY MEDICINE

## 2022-12-05 PROCEDURE — 80048 BASIC METABOLIC PNL TOTAL CA: CPT | Performed by: FAMILY MEDICINE

## 2022-12-05 RX ORDER — ERGOCALCIFEROL 1.25 MG/1
50000 CAPSULE, LIQUID FILLED ORAL WEEKLY
Qty: 12 CAPSULE | Refills: 3 | Status: SHIPPED | OUTPATIENT
Start: 2022-12-05 | End: 2023-10-25

## 2022-12-05 RX ORDER — CIMETIDINE 400 MG
400 TABLET ORAL 2 TIMES DAILY
Qty: 60 TABLET | Refills: 2 | Status: SHIPPED | OUTPATIENT
Start: 2022-12-05 | End: 2023-10-25

## 2022-12-05 ASSESSMENT — ANXIETY QUESTIONNAIRES
7. FEELING AFRAID AS IF SOMETHING AWFUL MIGHT HAPPEN: NOT AT ALL
GAD7 TOTAL SCORE: 0
GAD7 TOTAL SCORE: 0
6. BECOMING EASILY ANNOYED OR IRRITABLE: NOT AT ALL
5. BEING SO RESTLESS THAT IT IS HARD TO SIT STILL: NOT AT ALL
GAD7 TOTAL SCORE: 0
7. FEELING AFRAID AS IF SOMETHING AWFUL MIGHT HAPPEN: NOT AT ALL
IF YOU CHECKED OFF ANY PROBLEMS ON THIS QUESTIONNAIRE, HOW DIFFICULT HAVE THESE PROBLEMS MADE IT FOR YOU TO DO YOUR WORK, TAKE CARE OF THINGS AT HOME, OR GET ALONG WITH OTHER PEOPLE: NOT DIFFICULT AT ALL
3. WORRYING TOO MUCH ABOUT DIFFERENT THINGS: NOT AT ALL
4. TROUBLE RELAXING: NOT AT ALL
8. IF YOU CHECKED OFF ANY PROBLEMS, HOW DIFFICULT HAVE THESE MADE IT FOR YOU TO DO YOUR WORK, TAKE CARE OF THINGS AT HOME, OR GET ALONG WITH OTHER PEOPLE?: NOT DIFFICULT AT ALL
1. FEELING NERVOUS, ANXIOUS, OR ON EDGE: NOT AT ALL
2. NOT BEING ABLE TO STOP OR CONTROL WORRYING: NOT AT ALL

## 2022-12-05 ASSESSMENT — PATIENT HEALTH QUESTIONNAIRE - PHQ9
SUM OF ALL RESPONSES TO PHQ QUESTIONS 1-9: 0
SUM OF ALL RESPONSES TO PHQ QUESTIONS 1-9: 0
10. IF YOU CHECKED OFF ANY PROBLEMS, HOW DIFFICULT HAVE THESE PROBLEMS MADE IT FOR YOU TO DO YOUR WORK, TAKE CARE OF THINGS AT HOME, OR GET ALONG WITH OTHER PEOPLE: NOT DIFFICULT AT ALL

## 2022-12-05 ASSESSMENT — PAIN SCALES - GENERAL: PAINLEVEL: NO PAIN (0)

## 2022-12-05 NOTE — PROGRESS NOTES
"  Assessment & Plan     Plantar warts  It is difficult to determine if these are warts as they were cryo'd by podiatry.  Will do below work up for immune fuction   - CBC with platelets and differential; Future  - HIV Antigen Antibody Combo; Future  - Basic metabolic panel  (Ca, Cl, CO2, Creat, Gluc, K, Na, BUN); Future  - Adult Dermatology Referral; Future  - CBC with platelets and differential  - HIV Antigen Antibody Combo  - Basic metabolic panel  (Ca, Cl, CO2, Creat, Gluc, K, Na, BUN)  - cimetidine (TAGAMET) 400 MG tablet; Take 1 tablet (400 mg) by mouth 2 times daily        Vitamin D deficiency  Recheck labs and vit D   - Vitamin D Deficiency; Future  - vitamin D2 (ERGOCALCIFEROL) 34075 units (1250 mcg) capsule; Take 1 capsule (50,000 Units) by mouth once a week  - Vitamin D Deficiency      30 minutes spent on the date of the encounter doing chart review, history and exam, documentation and further activities per the note       BMI:   Estimated body mass index is 26.16 kg/m  as calculated from the following:    Height as of this encounter: 1.702 m (5' 7\").    Weight as of this encounter: 75.8 kg (167 lb).         No follow-ups on file.    Becca Pardo DO  [facility]    Rocael Rowe is a 47 year old, presenting for the following health issues:  Consult For (Talk about wart and oral treatment)      History of Present Illness       Reason for visit:  Foot problems  Symptom onset:  More than a month  Symptoms include:  Warts  Symptom intensity:  Severe  Symptom progression:  Staying the same  Had these symptoms before:  No    She eats 2-3 servings of fruits and vegetables daily.She consumes 1 sweetened beverage(s) daily.She exercises with enough effort to increase her heart rate 10 to 19 minutes per day.  She exercises with enough effort to increase her heart rate 3 or less days per week.   She is taking medications regularly.    Today's PHQ-9         PHQ-9 Total Score: 0    PHQ-9 Q9 " "Thoughts of better off dead/self-harm past 2 weeks :   Not at all    How difficult have these problems made it for you to do your work, take care of things at home, or get along with other people: Not difficult at all  Today's YESENIA-7 Score: 0       Warts  Onset/Duration: Several years- worse the past few months  Description (location/number): clusters- bilateral feet  Accompanying signs and symptoms (pain, redness): YES- can be painful at  times  History: prior warts: YES  Therapies tried and outcome: liqud nitrogen, \"chemical peel\" through derm- no relief. Was told to talk about oral treatment          Review of Systems   Constitutional, HEENT, cardiovascular, pulmonary, gi and gu systems are negative, except as otherwise noted.      Objective    /68 (BP Location: Right arm, Patient Position: Sitting, Cuff Size: Adult Regular)   Pulse 75   Temp 98.1  F (36.7  C) (Oral)   Resp 12   Ht 1.702 m (5' 7\")   Wt 75.8 kg (167 lb)   LMP 11/07/2022   SpO2 99%   BMI 26.16 kg/m    Body mass index is 26.16 kg/m .  Physical Exam   GENERAL: healthy, alert and no distress  SKIN: multiple areas of cyro'd skin on feet                "

## 2022-12-06 LAB
ANION GAP SERPL CALCULATED.3IONS-SCNC: 12 MMOL/L (ref 7–15)
BUN SERPL-MCNC: 17.2 MG/DL (ref 6–20)
CALCIUM SERPL-MCNC: 9 MG/DL (ref 8.6–10)
CHLORIDE SERPL-SCNC: 104 MMOL/L (ref 98–107)
CREAT SERPL-MCNC: 0.93 MG/DL (ref 0.51–0.95)
DEPRECATED CALCIDIOL+CALCIFEROL SERPL-MC: 40 UG/L (ref 20–75)
DEPRECATED HCO3 PLAS-SCNC: 22 MMOL/L (ref 22–29)
GFR SERPL CREATININE-BSD FRML MDRD: 76 ML/MIN/1.73M2
GLUCOSE SERPL-MCNC: 88 MG/DL (ref 70–99)
HIV 1+2 AB+HIV1 P24 AG SERPL QL IA: NONREACTIVE
POTASSIUM SERPL-SCNC: 3.9 MMOL/L (ref 3.4–5.3)
SODIUM SERPL-SCNC: 138 MMOL/L (ref 136–145)

## 2022-12-22 NOTE — ADDENDUM NOTE
Encounter addended by: Josh Mon, PT on: 12/22/2022 12:58 PM   Actions taken: Episode resolved, Clinical Note Signed

## 2022-12-22 NOTE — PROGRESS NOTES
[facility]    Outpatient Physical Therapy Discharge Note  Patient: Sonya Blackmon  : 1975    Beginning/End Dates of Reporting Period:  22 to 22    Referring Provider: Dr. Rodriguez Leo, DO    Therapy Diagnosis: low back pain, left lumbar radiculitis, + LE neural tension     Client Self Report: Has been going well overall! has not had really any of the leg pains!    Objective Measurements:  See Note    Goals:  Goal Identifier     Goal Description be able to return to recreation without increase in pain in 8 weeks:   Target Date     Date Met      Progress (detail required for progress note):  Met     Goal Identifier     Goal Description Patient will be able to do yardwork without increased pain in 10 weeks   Target Date     Date Met      Progress (detail required for progress note):  Met     Goal Identifier     Goal Description Patient will be able to clean house without increased pain in 8 weeks:   Target Date     Date Met      Progress (detail required for progress note):  Met       Plan:  Discharge from therapy.    Discharge:    Reason for Discharge: Patient has met all goals.    Equipment Issued: NA    Discharge Plan: Patient to continue home program.    Josh Mon, PT

## 2023-01-06 NOTE — PROGRESS NOTES
ASSESSMENT & PLAN    Sonya was seen today for pain.    Diagnoses and all orders for this visit:    Injury of right shoulder, initial encounter  -     XR Shoulder Right G/E 3 Views; Future  -     XR Cervical Spine 2/3 vws; Future  -     MR Shoulder Right w/o Contrast; Future  -     Physical Therapy Referral; Future    Neck pain  -     XR Cervical Spine 2/3 vws; Future  -     Physical Therapy Referral; Future    Subluxation of right shoulder joint, initial encounter  -     Physical Therapy Referral; Future      This issue is acute and Unchanged.    We discussed these other possible diagnosis: Right shoulder injury, likely subluxation. Cervical exam is reassuring. Given concern for rotator cuff injury on exam, will obtain MRI.    Plan:  - Today's Plan of Care:  MRI of the Right Shoulder   MRI Scheduling Appointments  [phone] [place]   [phone] [place]     Start with Home Exercise Program  Rehab: Physical Therapy: [facility] - [phone]    Discussed activity considerations and other supportive care including Ice/Heat, OTC and other topical medications as needed.    Follow Up: In clinic with Dr. Deleon after MRI (wait at least 1-2 days)     Concerning signs and symptoms were reviewed.  The patient expressed understanding of this management plan and all questions were answered at this time.    Cinthya Deleon MD [facility]  Sports Medicine Physician  [facility]      -----  Chief Complaint   Patient presents with     Right Shoulder - Pain       SUBJECTIVE  Sonya Blackmon is a/an 47 year old female who is seen as a self referral for evaluation of right shoulder pain.     The patient is seen by themselves.  The patient is Right handed    Onset: 1 month(s) ago. Reports insidious onset without acute precipitating event. Remembers she was in bed and laying on that side and felt a crack. Acute onset pain after that.   Location of Pain: lateral shoulder and  Upper arm, felt like the  shoulder dislocated/popped  Worsened by: lifting, flexion, extension, abduction   Better with: not moving it, rest   Treatments tried: ice, heat, Tylenol and icy hot  Associated symptoms: swelling, numbness, tingling, weakness of right shoulder and popping with movement  - some neck pain and occasional pain into arm    Orthopedic/Surgical history: NO, has a history of LB issues   Social History/Occupation: work at a desk     No family history pertinent to patient's problem today.     REVIEW OF SYSTEMS:  Review of Systems  Skin: no bruising, no swelling  Musculoskeletal: as above  Neurologic: occasional numbness, paresthesias  Remainder of review of systems is negative including constitutional, CV, pulmonary, GI, except as noted in HPI or medical history.    OBJECTIVE:  /65 (BP Location: Left arm)   Pulse 84   Wt 75.8 kg (167 lb)   BMI 26.16 kg/m     General: healthy, alert and in no distress  HEENT: no scleral icterus or conjunctival erythema  Skin: no suspicious lesions or rash. No jaundice.  CV: distal perfusion intact  Resp: normal respiratory effort without conversational dyspnea   Psych: normal mood and affect  Gait: NORMAL  Neuro: Normal light sensory exam of upper extremity    Cervical Spine Exam    Inspection:       No visible deformity        normal lordotic curvature maintained    Posture:      rounded shoulders and upper back    Tender:      none    Non-Tender:      remainder of cervical spine area    Range of Motion:       Full active and passive ROM forward flexion, extension, lateral rotation, lateral flexion.    Painful Motions:      none    Strength:     C4 (shoulder shrug)  symmetric 5/5       C5 (shoulder abduction) symmetric 5/5       C6 (elbow flexion) symmetric 5/5       C7 (elbow extension) symmetric 5/5       C8 (finger abduction, thumb flexion) symmetric 5/5    Reflexes:      C5 (biceps) symmetric normal       C6 (supinator) symmetric normal       C7 (triceps) symmetric  normal    Sensation:     grossly intact througout bilateral upper extremities    Special Tests:      neg (-) Spurling    Skin:     well perfused       capillary refill brisk    Lymphatics:      no edema noted in the upper extremities     Bilateral Shoulder exam  Inspection and Posture:       rounded shoulders and upper back    Skin:        no visible deformities    Tender:        subacromial space right    Non Tender:       remainder of shoulder bilateral    ROM:        forward flexion 90  right       abduction 120 right       internal rotation minimal right       external rotation 35 right       asymmetric scapular motion    Painful motions:       flexion right       elevation right    Strength:        abduction 4/5 right       flexion 4/5 right       internal rotation 4/5 right       external rotation 4/5 right    Impingement testing:       positive (+) Neer right       positive (+) Valenzuela right    Sensation:        normal sensation over shoulder and upper extremity     RADIOLOGY:  I independently ordered, visualized and reviewed these images with the patient  4 XR views of right shoulder reviewed: no acute bony abnormality, no significant degenerative change  - will follow official read    2 XR views of cervical spine reviewed: no acute bony abnormality, no significant degenerative change  - will follow official read      Review of the result(s) of each unique test - XRs

## 2023-01-12 ENCOUNTER — ANCILLARY PROCEDURE (OUTPATIENT)
Dept: GENERAL RADIOLOGY | Facility: CLINIC | Age: 48
End: 2023-01-12
Attending: PEDIATRICS
Payer: COMMERCIAL

## 2023-01-12 ENCOUNTER — OFFICE VISIT (OUTPATIENT)
Dept: ORTHOPEDICS | Facility: CLINIC | Age: 48
End: 2023-01-12
Payer: COMMERCIAL

## 2023-01-12 VITALS
DIASTOLIC BLOOD PRESSURE: 65 MMHG | SYSTOLIC BLOOD PRESSURE: 100 MMHG | HEART RATE: 84 BPM | BODY MASS INDEX: 26.16 KG/M2 | WEIGHT: 167 LBS

## 2023-01-12 DIAGNOSIS — M25.511 RIGHT SHOULDER PAIN: ICD-10-CM

## 2023-01-12 DIAGNOSIS — M54.2 NECK PAIN: ICD-10-CM

## 2023-01-12 DIAGNOSIS — S49.91XA INJURY OF RIGHT SHOULDER, INITIAL ENCOUNTER: Primary | ICD-10-CM

## 2023-01-12 DIAGNOSIS — S43.001A SUBLUXATION OF RIGHT SHOULDER JOINT, INITIAL ENCOUNTER: ICD-10-CM

## 2023-01-12 PROCEDURE — 72040 X-RAY EXAM NECK SPINE 2-3 VW: CPT | Mod: TC | Performed by: RADIOLOGY

## 2023-01-12 PROCEDURE — 99214 OFFICE O/P EST MOD 30 MIN: CPT | Performed by: PEDIATRICS

## 2023-01-12 PROCEDURE — 73030 X-RAY EXAM OF SHOULDER: CPT | Mod: TC | Performed by: RADIOLOGY

## 2023-01-12 NOTE — LETTER
1/12/2023         RE: Sonya Blackmon  [address] [address]        Dear Colleague,    Thank you for referring your patient, Sonya Blackmon, to the [facility]. Please see a copy of my visit note below.    ASSESSMENT & PLAN    Sonya was seen today for pain.    Diagnoses and all orders for this visit:    Injury of right shoulder, initial encounter  -     XR Shoulder Right G/E 3 Views; Future  -     XR Cervical Spine 2/3 vws; Future  -     MR Shoulder Right w/o Contrast; Future  -     Physical Therapy Referral; Future    Neck pain  -     XR Cervical Spine 2/3 vws; Future  -     Physical Therapy Referral; Future    Subluxation of right shoulder joint, initial encounter  -     Physical Therapy Referral; Future      This issue is acute and Unchanged.    We discussed these other possible diagnosis: Right shoulder injury, likely subluxation. Cervical exam is reassuring. Given concern for rotator cuff injury on exam, will obtain MRI.    Plan:  - Today's Plan of Care:  MRI of the Right Shoulder   MRI Scheduling Appointments  [phone] [place]   [phone] [place]     Start with Home Exercise Program  Rehab: Physical Therapy: [facility] - [phone]    Discussed activity considerations and other supportive care including Ice/Heat, OTC and other topical medications as needed.    Follow Up: In clinic with Dr. Deleon after MRI (wait at least 1-2 days)     Concerning signs and symptoms were reviewed.  The patient expressed understanding of this management plan and all questions were answered at this time.    Cinthya Deleon MD [facility]  Sports Medicine Physician  [facility]      -----  Chief Complaint   Patient presents with     Right Shoulder - Pain       SUBJECTIVE  Sonya Blackmon is a/an 47 year old female who is seen as a self referral for evaluation of right shoulder pain.     The patient is seen by  themselves.  The patient is Right handed    Onset: 1 month(s) ago. Reports insidious onset without acute precipitating event. Remembers she was in bed and laying on that side and felt a crack. Acute onset pain after that.   Location of Pain: lateral shoulder and  Upper arm, felt like the shoulder dislocated/popped  Worsened by: lifting, flexion, extension, abduction   Better with: not moving it, rest   Treatments tried: ice, heat, Tylenol and icy hot  Associated symptoms: swelling, numbness, tingling, weakness of right shoulder and popping with movement  - some neck pain and occasional pain into arm    Orthopedic/Surgical history: NO, has a history of LB issues   Social History/Occupation: work at a desk     No family history pertinent to patient's problem today.     REVIEW OF SYSTEMS:  Review of Systems  Skin: no bruising, no swelling  Musculoskeletal: as above  Neurologic: occasional numbness, paresthesias  Remainder of review of systems is negative including constitutional, CV, pulmonary, GI, except as noted in HPI or medical history.    OBJECTIVE:  /65 (BP Location: Left arm)   Pulse 84   Wt 75.8 kg (167 lb)   BMI 26.16 kg/m     General: healthy, alert and in no distress  HEENT: no scleral icterus or conjunctival erythema  Skin: no suspicious lesions or rash. No jaundice.  CV: distal perfusion intact  Resp: normal respiratory effort without conversational dyspnea   Psych: normal mood and affect  Gait: NORMAL  Neuro: Normal light sensory exam of upper extremity    Cervical Spine Exam    Inspection:       No visible deformity        normal lordotic curvature maintained    Posture:      rounded shoulders and upper back    Tender:      none    Non-Tender:      remainder of cervical spine area    Range of Motion:       Full active and passive ROM forward flexion, extension, lateral rotation, lateral flexion.    Painful Motions:      none    Strength:     C4 (shoulder shrug)  symmetric 5/5       C5 (shoulder  abduction) symmetric 5/5       C6 (elbow flexion) symmetric 5/5       C7 (elbow extension) symmetric 5/5       C8 (finger abduction, thumb flexion) symmetric 5/5    Reflexes:      C5 (biceps) symmetric normal       C6 (supinator) symmetric normal       C7 (triceps) symmetric normal    Sensation:     grossly intact througout bilateral upper extremities    Special Tests:      neg (-) Spurling    Skin:     well perfused       capillary refill brisk    Lymphatics:      no edema noted in the upper extremities     Bilateral Shoulder exam  Inspection and Posture:       rounded shoulders and upper back    Skin:        no visible deformities    Tender:        subacromial space right    Non Tender:       remainder of shoulder bilateral    ROM:        forward flexion 90  right       abduction 120 right       internal rotation minimal right       external rotation 35 right       asymmetric scapular motion    Painful motions:       flexion right       elevation right    Strength:        abduction 4/5 right       flexion 4/5 right       internal rotation 4/5 right       external rotation 4/5 right    Impingement testing:       positive (+) Neer right       positive (+) Valenzuela right    Sensation:        normal sensation over shoulder and upper extremity     RADIOLOGY:  I independently ordered, visualized and reviewed these images with the patient  4 XR views of right shoulder reviewed: no acute bony abnormality, no significant degenerative change  - will follow official read    2 XR views of cervical spine reviewed: no acute bony abnormality, no significant degenerative change  - will follow official read      Review of the result(s) of each unique test - XRs             Again, thank you for allowing me to participate in the care of your patient.        Sincerely,        Cinthya Deleon MD

## 2023-01-12 NOTE — PATIENT INSTRUCTIONS
We discussed these other possible diagnosis: Right shoulder injury, likely subluxation. Cervical exam is reassuring. Given concern for rotator cuff injury on exam, will obtain MRI.    Plan:  - Today's Plan of Care:  MRI of the Right Shoulder   MRI Scheduling Appointments  [phone] [place]   [phone] [place]     Start with Home Exercise Program  Rehab: Physical Therapy: [facility] - [phone]    Discussed activity considerations and other supportive care including Ice/Heat, OTC and other topical medications as needed.    Follow Up: In clinic with Dr. Deleon after MRI (wait at least 1-2 days)     If you have any further questions for your physician or physician s care team you can call [phone] and use option 3 to leave a voice message.

## 2023-01-16 ENCOUNTER — ANCILLARY PROCEDURE (OUTPATIENT)
Dept: MRI IMAGING | Facility: CLINIC | Age: 48
End: 2023-01-16
Attending: PEDIATRICS
Payer: COMMERCIAL

## 2023-01-16 DIAGNOSIS — S49.91XA INJURY OF RIGHT SHOULDER, INITIAL ENCOUNTER: ICD-10-CM

## 2023-01-16 PROCEDURE — 73221 MRI JOINT UPR EXTREM W/O DYE: CPT | Mod: RT | Performed by: RADIOLOGY

## 2023-01-17 ENCOUNTER — HOSPITAL ENCOUNTER (OUTPATIENT)
Dept: PHYSICAL THERAPY | Facility: REHABILITATION | Age: 48
Discharge: HOME | End: 2023-01-17
Attending: PEDIATRICS
Payer: COMMERCIAL

## 2023-01-17 DIAGNOSIS — S49.91XA INJURY OF RIGHT SHOULDER, INITIAL ENCOUNTER: Primary | ICD-10-CM

## 2023-01-17 DIAGNOSIS — M54.2 NECK PAIN: ICD-10-CM

## 2023-01-17 DIAGNOSIS — S43.001A SUBLUXATION OF RIGHT SHOULDER JOINT, INITIAL ENCOUNTER: ICD-10-CM

## 2023-01-17 PROCEDURE — 97110 THERAPEUTIC EXERCISES: CPT | Mod: GP | Performed by: PHYSICAL THERAPIST

## 2023-01-17 PROCEDURE — 97161 PT EVAL LOW COMPLEX 20 MIN: CPT | Mod: GP | Performed by: PHYSICAL THERAPIST

## 2023-01-31 NOTE — PROGRESS NOTES
Soledad is a 47 year old who is being evaluated via a billable video visit.      How would you like to obtain your AVS? MyChart  If the video visit is dropped, the invitation should be resent by: Text to cell phone: [phone]  Will anyone else be joining your video visit? No  {If patient encounters technical issues they should call [phone] :357716}        {PROVIDER CHARTING PREFERENCE:460476}    Subjective   Soledad is a 47 year old{ACCOMPANIED BY STATEMENT (Optional):279885}, presenting for the following health issues:  No chief complaint on file.    ASSESSMENT & PLAN    There are no diagnoses linked to this encounter.  This issue is {ACUTE/CHRONIC:642245} and {IMPROVING WORSENIN}.      {FOLLOW UP PLANS (Optional):240129}    Cinthya Deleon MD  [facility] SP[facility] MEDIC[facility]    SUBJECTIVE- Interim History 2023    No chief complaint on file.    Sonya Blackmon is a 47 year old female who is seen in f/u up for Data Unavailable. Since last visit on 23 patient has had some improvement in her symptoms but she continues to have pain with sleeping on her right side and lifting and over head reaching. MRI completed on 23.  - Now ~ 1.5 months from initial onset    The patient is seen by themselves.  The patient is Right handed     Onset: Reports insidious onset without acute precipitating event. Remembers she was in bed and laying on that side and felt a crack. Acute onset pain after that.   Location of Pain: lateral shoulder and  Upper arm, felt like the shoulder dislocated/popped  Worsened by: lifting, flexion, extension, abduction   Better with: not moving it, rest   Treatments tried: ice, heat, Tylenol and icy hot  Associated symptoms: swelling, numbness, tingling, weakness of right shoulder and popping with movement  - some neck pain and occasional pain into arm     Orthopedic/Surgical history: NO, has a history of LB issues   Social History/Occupation: work  "at a desk      No family history pertinent to patient's problem today.     HPI     ***    Review of Systems   {ROS COMP (Optional):847453}      Objective           Vitals:  No vitals were obtained today due to virtual visit.    Physical Exam   {video visit exam brief selected:298940::\"GENERAL: Healthy, alert and no distress\",\"EYES: Eyes grossly normal to inspection.  No discharge or erythema, or obvious scleral/conjunctival abnormalities.\",\"RESP: No audible wheeze, cough, or visible cyanosis.  No visible retractions or increased work of breathing.  \",\"SKIN: Visible skin clear. No significant rash, abnormal pigmentation or lesions.\",\"NEURO: Cranial nerves grossly intact.  Mentation and speech appropriate for age.\",\"PSYCH: Mentation appears normal, affect normal/bright, judgement and insight intact, normal speech and appearance well-groomed.\"}    {Diagnostic Test Results (Optional):680726}    {AMBULATORY ATTESTATION (Optional):682362}        Video-Visit Details    Type of service:  Video Visit     Originating Location (pt. Location): {video visit patient location:122942::\"Home\"}  {PROVIDER LOCATION On-site should be selected for visits conducted from your clinic location or adjoining [facility] hospital, academic office, or other nearby [facility] building. Off-site should be selected for all other provider locations, including home:701634}  Distant Location (provider location):  {virtual location provider:843053}  Platform used for Video Visit: {Virtual Visit Platforms:058809::\"silkfred\"}      "

## 2023-02-01 ENCOUNTER — VIRTUAL VISIT (OUTPATIENT)
Dept: ORTHOPEDICS | Facility: CLINIC | Age: 48
End: 2023-02-01
Payer: COMMERCIAL

## 2023-02-01 VITALS — WEIGHT: 167 LBS | BODY MASS INDEX: 26.84 KG/M2 | HEIGHT: 66 IN

## 2023-02-01 DIAGNOSIS — S43.001D SUBLUXATION OF RIGHT SHOULDER JOINT, SUBSEQUENT ENCOUNTER: ICD-10-CM

## 2023-02-01 DIAGNOSIS — M75.21 BICEPS TENDINITIS OF RIGHT UPPER EXTREMITY: ICD-10-CM

## 2023-02-01 DIAGNOSIS — S49.91XD INJURY OF RIGHT SHOULDER, SUBSEQUENT ENCOUNTER: Primary | ICD-10-CM

## 2023-02-01 DIAGNOSIS — M75.112 INCOMPLETE TEAR OF LEFT ROTATOR CUFF, UNSPECIFIED WHETHER TRAUMATIC: ICD-10-CM

## 2023-02-01 PROCEDURE — 99213 OFFICE O/P EST LOW 20 MIN: CPT | Mod: 95 | Performed by: PEDIATRICS

## 2023-02-01 RX ORDER — ISOTRETINOIN 40 MG/1
CAPSULE, GELATIN COATED ORAL
COMMUNITY
Start: 2022-03-28 | End: 2023-07-06

## 2023-02-01 RX ORDER — TRETINOIN 0.25 MG/G
CREAM TOPICAL
COMMUNITY
Start: 2022-04-25 | End: 2023-10-25

## 2023-02-01 ASSESSMENT — PAIN SCALES - GENERAL: PAINLEVEL: MODERATE PAIN (4)

## 2023-02-01 NOTE — LETTER
2/1/2023         RE: Sonya Blackmon  [address] [address]        Dear Colleague,    Thank you for referring your patient, Sonya Blackmon, to the [facility] [place]. Please see a copy of my visit note below.    Soledad is a 47 year old who is being evaluated via a billable video visit.      How would you like to obtain your AVS? MyChart  If the video visit is dropped, the invitation should be resent by: Text to cell phone: [phone]  Will anyone else be joining your video visit? No        ASSESSMENT & PLAN    There are no diagnoses linked to this encounter.  This issue is acute and Improving.    Plan:  - Today's Plan of Care:  Continue Physical Therapy  Discussed activity considerations and other supportive care including Ice/Heat, OTC and other topical medications as needed.    -We also discussed other future treatment options:  Consideration of injections (subacromial v. Biceps)    Follow Up: 4-6 weeks    Concerning signs and symptoms were reviewed.  The patient expressed understanding of this management plan and all questions were answered at this time.    Cinthya Deleon MD [facility]  Sports Medicine Physician  [facility]      SUBJECTIVE- Interim History January 31, 2023    No chief complaint on file.    Sonya Blackmon is a 47 year old female who is seen in f/u up for right shoulder. Since last visit on 1/12/23 patient has had some improvement in her symptoms but she continues to have pain with sleeping on her right side and lifting and over head reaching. MRI completed on 1/16/23. Has done 1 PT visit  - Now ~ 1.5 months from initial onset    The patient is seen by themselves.  The patient is Right handed     Initial History Onset: Reports insidious onset without acute precipitating event. Remembers she was in bed and laying on that side and felt a crack. Acute onset pain after that.   Location of Pain: lateral shoulder and   Upper arm, felt like the shoulder dislocated/popped  Worsened by: lifting, flexion, extension, abduction   Better with: not moving it, rest   Treatments tried: ice, heat, Tylenol and icy hot  Associated symptoms: swelling, numbness, tingling, weakness of right shoulder and popping with movement  - some neck pain and occasional pain into arm     Orthopedic/Surgical history: NO, has a history of LB issues   Social History/Occupation: work at a desk      No family history pertinent to patient's problem today.       Review of Systems   Skin: no bruising, no swelling  Musculoskeletal: as above  Neurologic: no numbness, paresthesias  Remainder of review of systems is negative including constitutional, CV, pulmonary, GI, except as noted in HPI or medical history.     Objective    Vitals - Patient Reported  Pain Score: Moderate Pain (4)        Physical Exam   GENERAL: Healthy, alert and no distress  EYES: Eyes grossly normal to inspection.  No discharge or erythema, or obvious scleral/conjunctival abnormalities.  RESP: No audible wheeze, cough, or visible cyanosis.  No visible retractions or increased work of breathing.    SKIN: Visible skin clear. No significant rash, abnormal pigmentation or lesions.  NEURO: Cranial nerves grossly intact.  Mentation and speech appropriate for age.  PSYCH: Mentation appears normal, affect normal/bright, judgement and insight intact, normal speech and appearance well-groomed.    Shoulder Exam Virtual:  Shoulder flexion to 160, abduction to 120, somewhat improved from prior    Reviewed prior reassuring cervical exam from visit 1/12/2023    RADIOLOGY  Reviewed MRI from 1/16/2023 -supraspinatus tendinosis with low-grade intrasubstance tearing, subscap tendinosis, labral fraying with subtle biceps tear and tendinosis.    MR Shoulder Right w/o Contrast  Result Date: 1/16/2023  EXAM: MR  right   shoulder without  contrast 1/16/2023 10:00 AM TECHNIQUE: Multiplanar, multisequence imaging of the  right shoulder were obtained without administration of intravenous or intra-articular gadolinium contrast using routine protocol. History: eval possible RC injury; Injury of right shoulder, initial encounter Comparison: Right shoulder x-ray 1/12/2022. Findings: ROTATOR CUFF and ASSOCIATED STRUCTURES Rotator cuff: Supraspinatus tendinosis with low-grade intrasubstance tear of the mid fibers at the myotendinous junction. Subscapularis tendinosis. Infraspinatus and teres minor tendons are intact. Bursa: No subacromial or subdeltoid bursal fluid. Musculature: Muscle bulk of rotator cuff is preserved. Deltoid muscle bulk is also preserved.  No muscle edema. Acromioclavicular joint There are mild degenerative changes of the acromioclavicular joint. Acromion is type 2 in sagittal morphology. Coracoacromial ligament is not thickened. OSSEOUS STRUCTURES No fracture, marrow contusion or marrow infiltration. LONG BICIPITAL TENDON The long head of the biceps tendon is normally situated within the bicipital groove. Mild intrasubstance tendinosis at the pulley. GLENOHUMERAL JOINT Joint fluid: Physiologic amount of joint fluid is  present. Cartilage and subarticular bone:  No focal hyaline cartilage defects are noted. No Hill-Sachs, reverse Hill-Sachs, or bony Bankart lesions are seen. Labrum: Fraying at the bicipital anchor with subtle tear posteriorly. Normal variant [place] complex. ANCILLARY FINDINGS: None   Impression: 1.  Supraspinatus tendinosis with low-grade intrasubstance tear of the mid fibers at the myotendinous junction. 2.  Subscapularis tendinosis. 3.  Fraying of the superior labrum at the bicipital anchor with subtle tear posteriorly. 4.  Mild tendinosis of the biceps pulley. I have personally reviewed the examination and initial interpretation and I agree with the findings. JUTTA ELLERMANN, MD   SYSTEM ID:  [id number]         Video-Visit Details    Type of service:  Video Visit     Originating Location (pt.  Location): Home    Distant Location (provider location):  On-site  Platform used for Video Visit: Agata     Visit from 9:10 - 9:17AM    Reviewed PT note  Review of the result(s) of each unique test - MRI            Again, thank you for allowing me to participate in the care of your patient.        Sincerely,        Cinthya Deleon MD

## 2023-02-01 NOTE — PROGRESS NOTES
Soledad is a 47 year old who is being evaluated via a billable video visit.      How would you like to obtain your AVS? MyChart  If the video visit is dropped, the invitation should be resent by: Text to cell phone: [phone]  Will anyone else be joining your video visit? No        ASSESSMENT & PLAN    There are no diagnoses linked to this encounter.  This issue is acute and Improving.    Plan:  - Today's Plan of Care:  Continue Physical Therapy  Discussed activity considerations and other supportive care including Ice/Heat, OTC and other topical medications as needed.    -We also discussed other future treatment options:  Consideration of injections (subacromial v. Biceps)    Follow Up: 4-6 weeks    Concerning signs and symptoms were reviewed.  The patient expressed understanding of this management plan and all questions were answered at this time.    Cinthya Deleon MD [facility]  Sports Medicine Physician  [facility]      SUBJECTIVE- Interim History January 31, 2023    No chief complaint on file.    Sonya Blackmon is a 47 year old female who is seen in f/u up for right shoulder. Since last visit on 1/12/23 patient has had some improvement in her symptoms but she continues to have pain with sleeping on her right side and lifting and over head reaching. MRI completed on 1/16/23. Has done 1 PT visit  - Now ~ 1.5 months from initial onset    The patient is seen by themselves.  The patient is Right handed     Initial History Onset: Reports insidious onset without acute precipitating event. Remembers she was in bed and laying on that side and felt a crack. Acute onset pain after that.   Location of Pain: lateral shoulder and  Upper arm, felt like the shoulder dislocated/popped  Worsened by: lifting, flexion, extension, abduction   Better with: not moving it, rest   Treatments tried: ice, heat, Tylenol and icy hot  Associated symptoms: swelling, numbness, tingling, weakness of right shoulder and  popping with movement  - some neck pain and occasional pain into arm     Orthopedic/Surgical history: NO, has a history of LB issues   Social History/Occupation: work at a desk      No family history pertinent to patient's problem today.       Review of Systems   Skin: no bruising, no swelling  Musculoskeletal: as above  Neurologic: no numbness, paresthesias  Remainder of review of systems is negative including constitutional, CV, pulmonary, GI, except as noted in HPI or medical history.      Objective    Vitals - Patient Reported  Pain Score: Moderate Pain (4)        Physical Exam   GENERAL: Healthy, alert and no distress  EYES: Eyes grossly normal to inspection.  No discharge or erythema, or obvious scleral/conjunctival abnormalities.  RESP: No audible wheeze, cough, or visible cyanosis.  No visible retractions or increased work of breathing.    SKIN: Visible skin clear. No significant rash, abnormal pigmentation or lesions.  NEURO: Cranial nerves grossly intact.  Mentation and speech appropriate for age.  PSYCH: Mentation appears normal, affect normal/bright, judgement and insight intact, normal speech and appearance well-groomed.    Shoulder Exam Virtual:  Shoulder flexion to 160, abduction to 120, somewhat improved from prior    Reviewed prior reassuring cervical exam from visit 1/12/2023    RADIOLOGY  Reviewed MRI from 1/16/2023 -supraspinatus tendinosis with low-grade intrasubstance tearing, subscap tendinosis, labral fraying with subtle biceps tear and tendinosis.    MR Shoulder Right w/o Contrast  Result Date: 1/16/2023  EXAM: MR  right   shoulder without  contrast 1/16/2023 10:00 AM TECHNIQUE: Multiplanar, multisequence imaging of the right shoulder were obtained without administration of intravenous or intra-articular gadolinium contrast using routine protocol. History: eval possible RC injury; Injury of right shoulder, initial encounter Comparison: Right shoulder x-ray 1/12/2022. Findings: ROTATOR CUFF  and ASSOCIATED STRUCTURES Rotator cuff: Supraspinatus tendinosis with low-grade intrasubstance tear of the mid fibers at the myotendinous junction. Subscapularis tendinosis. Infraspinatus and teres minor tendons are intact. Bursa: No subacromial or subdeltoid bursal fluid. Musculature: Muscle bulk of rotator cuff is preserved. Deltoid muscle bulk is also preserved.  No muscle edema. Acromioclavicular joint There are mild degenerative changes of the acromioclavicular joint. Acromion is type 2 in sagittal morphology. Coracoacromial ligament is not thickened. OSSEOUS STRUCTURES No fracture, marrow contusion or marrow infiltration. LONG BICIPITAL TENDON The long head of the biceps tendon is normally situated within the bicipital groove. Mild intrasubstance tendinosis at the pulley. GLENOHUMERAL JOINT Joint fluid: Physiologic amount of joint fluid is  present. Cartilage and subarticular bone:  No focal hyaline cartilage defects are noted. No Hill-Sachs, reverse Hill-Sachs, or bony Bankart lesions are seen. Labrum: Fraying at the bicipital anchor with subtle tear posteriorly. Normal variant Tai complex. ANCILLARY FINDINGS: None   Impression: 1.  Supraspinatus tendinosis with low-grade intrasubstance tear of the mid fibers at the myotendinous junction. 2.  Subscapularis tendinosis. 3.  Fraying of the superior labrum at the bicipital anchor with subtle tear posteriorly. 4.  Mild tendinosis of the biceps pulley. I have personally reviewed the examination and initial interpretation and I agree with the findings. JUTTA ELLERMANN, MD   SYSTEM ID:  [id number]          Video-Visit Details    Type of service:  Video Visit     Originating Location (pt. Location): Home    Distant Location (provider location):  On-site  Platform used for Video Visit: AmWell     Visit from 9:10 - 9:17AM    Reviewed PT note  Review of the result(s) of each unique test - MRI

## 2023-02-01 NOTE — PATIENT INSTRUCTIONS
Plan:  - Today's Plan of Care:  Continue Physical Therapy  Discussed activity considerations and other supportive care including Ice/Heat, OTC and other topical medications as needed.    -We also discussed other future treatment options:  Consideration of injections (subacromial v. Biceps)    Follow Up: 4-6 weeks    If you have any further questions for your physician or physician s care team you can call [phone] and use option 3 to leave a voice message.

## 2023-04-17 NOTE — PROGRESS NOTES
"[facility]    Outpatient Physical Therapy Discharge Note  Patient: Sonya Blackmon  : 1975    Beginning/End Dates of Reporting Period:  23 eval only    Referring Provider: Cinthya Deleon MD    Therapy Diagnosis: Injury of right shoulder, initial encounter  Neck pain  Subluxation of right shoulder joint, initial encounter     Client Self Report: \"Pain has not subsided, still painful to move my arm in any direction, as long as I don't move it I feel fine    Objective Measurements:  eval only    Outcome Measures (most recent score):  eval only    Goals: eval only  Goal Identifier Pt will restore 150 deg of AROM of flexion to help with overreaching activities in 6 weeks   Goal Description     Target Date     Date Met      Progress (detail required for progress note):       Goal Identifier Pt will restore 80 deg of ER AROM to help with dressing in 6 weeks   Goal Description     Target Date     Date Met      Progress (detail required for progress note):       Goal Identifier Pt will be able to complete work activities without an increase in shoulder pain within 6 weeks   Goal Description     Target Date     Date Met      Progress (detail required for progress note):       Goal Identifier Pt will restore 150 deg of shoulder ABD ROM to increase functional independence with overhead activities   Goal Description     Target Date     Date Met      Progress (detail required for progress note):           Plan:  Discharge from therapy.    Discharge:    Reason for Discharge: Patient has failed to schedule further appointments.    Equipment Issued: NA    Discharge Plan: Patient to continue home program.    Josh Mon, PT  "

## 2023-04-17 NOTE — ADDENDUM NOTE
Encounter addended by: Josh Mon, PT on: 4/17/2023 1:06 PM   Actions taken: Episode resolved, Clinical Note Signed

## 2023-05-04 ENCOUNTER — OFFICE VISIT (OUTPATIENT)
Dept: DERMATOLOGY | Facility: CLINIC | Age: 48
End: 2023-05-04
Attending: FAMILY MEDICINE
Payer: COMMERCIAL

## 2023-05-04 DIAGNOSIS — B07.0 PLANTAR WARTS: ICD-10-CM

## 2023-05-04 PROCEDURE — 11900 INJECT SKIN LESIONS </W 7: CPT | Mod: 59 | Performed by: NURSE PRACTITIONER

## 2023-05-04 PROCEDURE — 17110 DESTRUCTION B9 LES UP TO 14: CPT | Performed by: NURSE PRACTITIONER

## 2023-05-04 RX ORDER — CANDIDA ALBICANS 1000 [PNU]/ML
0.4 INJECTION, SOLUTION INTRADERMAL ONCE
Status: COMPLETED | OUTPATIENT
Start: 2023-05-04 | End: 2023-05-04

## 2023-05-04 RX ADMIN — CANDIDA ALBICANS 0.4 ML: 1000 INJECTION, SOLUTION INTRADERMAL at 10:02

## 2023-05-04 NOTE — LETTER
5/4/2023         RE: Sonya Blackmon  [address] [address]        Dear Colleague,    Thank you for referring your patient, Sonya Blackmon, to the [facility]. Please see a copy of my visit note below.    [facility] Note  Encounter Date: May 4, 2023  Office Visit     Reviewed patients past medical history and pertinent chart review prior to patients visit today.     Dermatology Problem List:  Warts, bilateral feet.  Cryotherapy to wart on right toe and Candida injection to left plantar foot warts 5/4/2023     ____________________________________________    Assessment & Plan:     # Verruca vulgaris x5. Counseled on natural history and viral etiology of this condition. Counseled that these are often recalcitrant to treatment. Discussed all treatment options that we offer as well as side effects of each. Advised that highest rates of success can be observed with combination monthly treatment in office and home treatments as well    - Cryotherapy to lesion on right medial second toe only: Patient declines cryotherapy on the left foot as these were just recently treated with cryotherapy and are  and healing.  Patient elects to treat warts today with cryotherapy. After verbal consent and discussion of risks and benefits including but no limited to dyspigmentation/scar, blister, and pain. A total of 1 warts were treated with 1-2mm freeze border for 3 cycle with liquid nitrogen. Post cryotherapy instructions were provided.     -Candida injection: patient has failed successive treatment with cryotherapy so I have recommended a series of intralesional immunotherapy with candida antigen.  This is a series of 3-4 injections and is about 70% effective.  Most patients don't see any improvement until after at least 2 injections.  After verbal consent was obtained, the skin was cleaned with an alcohol wipe and 0.2 ml of candida was  injected under 2 lesions on the left plantar foot The patient tolerated the procedure well.     Follow-up: 1 month(s) in-person, or earlier for new or changing lesions    Lesley Blakely CNP  Dermatology   _______________________________________    CC: Wart (On both feet, present for over 2 years. Has tried cryo, laser, and chemical treatments with no success. )    HPI:  Ms. Sonya Blackmon is a(n) 47 year old female who presents today as a new patient for warts on the feet. Patient has tried laser and cryotherapy.  She just had cryotherapy about a week and a half ago without successful resolution and is here to discuss further treatment options. The warts are symptomatic and bothersome.       Patient is otherwise feeling well, without additional skin concerns.      Physical Exam:  Vitals: There were no vitals taken for this visit.  SKIN: Focused examination of feet was performed.  - There is/are verrucous papule(s) with thrombosed capillaries and interruption of dermatoglyphics on the right medial second toe.  There is 4 areas on the left plantar ball of foot and the left plantar toe have recently been traumatized by cryotherapy and it is unclear if they are still present for have full resolution..      - No other lesions of concern on areas examined.     Medications:  Current Outpatient Medications   Medication     Multiple Vitamin (DAILY MULTIVITAMIN PO)     cimetidine (TAGAMET) 400 MG tablet     levonorgestrel (MIRENA) 20 MCG/24HR IUD     meloxicam (MOBIC) 15 MG tablet     metroNIDAZOLE (METROCREAM) 0.75 % external cream     Sulfacetamide Sodium 10 % LIQD     tretinoin (RETIN-A) 0.025 % external cream     vitamin D2 (ERGOCALCIFEROL) 30576 units (1250 mcg) capsule     ZENATANE 40 MG capsule     No current facility-administered medications for this visit.      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       Again, thank you for allowing me to participate in the care of your patient.        Sincerely,        DAVID Young CNP

## 2023-05-04 NOTE — PROGRESS NOTES
[facility] Dermatology Note  Encounter Date: May 4, 2023  Office Visit     Reviewed patients past medical history and pertinent chart review prior to patients visit today.     Dermatology Problem List:  Warts, bilateral feet.  Cryotherapy to wart on right toe and Candida injection to left plantar foot warts 5/4/2023     ____________________________________________    Assessment & Plan:     # Verruca vulgaris x5. Counseled on natural history and viral etiology of this condition. Counseled that these are often recalcitrant to treatment. Discussed all treatment options that we offer as well as side effects of each. Advised that highest rates of success can be observed with combination monthly treatment in office and home treatments as well    - Cryotherapy to lesion on right medial second toe only: Patient declines cryotherapy on the left foot as these were just recently treated with cryotherapy and are  and healing.  Patient elects to treat warts today with cryotherapy. After verbal consent and discussion of risks and benefits including but no limited to dyspigmentation/scar, blister, and pain. A total of 1 warts were treated with 1-2mm freeze border for 3 cycle with liquid nitrogen. Post cryotherapy instructions were provided.     -Candida injection: patient has failed successive treatment with cryotherapy so I have recommended a series of intralesional immunotherapy with candida antigen.  This is a series of 3-4 injections and is about 70% effective.  Most patients don't see any improvement until after at least 2 injections.  After verbal consent was obtained, the skin was cleaned with an alcohol wipe and 0.2 ml of candida was injected under 2 lesions on the left plantar foot The patient tolerated the procedure well.     Follow-up: 1 month(s) in-person, or earlier for new or changing lesions    Lesley BALDEV Blakely  Dermatology   _______________________________________    CC: Wart (On both  feet, present for over 2 years. Has tried cryo, laser, and chemical treatments with no success. )    HPI:  Ms. Sonya Blackmon is a(n) 47 year old female who presents today as a new patient for warts on the feet. Patient has tried laser and cryotherapy.  She just had cryotherapy about a week and a half ago without successful resolution and is here to discuss further treatment options. The warts are symptomatic and bothersome.       Patient is otherwise feeling well, without additional skin concerns.      Physical Exam:  Vitals: There were no vitals taken for this visit.  SKIN: Focused examination of feet was performed.  - There is/are verrucous papule(s) with thrombosed capillaries and interruption of dermatoglyphics on the right medial second toe.  There is 4 areas on the left plantar ball of foot and the left plantar toe have recently been traumatized by cryotherapy and it is unclear if they are still present for have full resolution..      - No other lesions of concern on areas examined.     Medications:  Current Outpatient Medications   Medication     Multiple Vitamin (DAILY MULTIVITAMIN PO)     cimetidine (TAGAMET) 400 MG tablet     levonorgestrel (MIRENA) 20 MCG/24HR IUD     meloxicam (MOBIC) 15 MG tablet     metroNIDAZOLE (METROCREAM) 0.75 % external cream     Sulfacetamide Sodium 10 % LIQD     tretinoin (RETIN-A) 0.025 % external cream     vitamin D2 (ERGOCALCIFEROL) 84613 units (1250 mcg) capsule     ZENATANE 40 MG capsule     No current facility-administered medications for this visit.      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

## 2023-06-02 ENCOUNTER — OFFICE VISIT (OUTPATIENT)
Dept: DERMATOLOGY | Facility: CLINIC | Age: 48
End: 2023-06-02
Payer: COMMERCIAL

## 2023-06-02 DIAGNOSIS — B07.0 PLANTAR WARTS: Primary | ICD-10-CM

## 2023-06-02 PROCEDURE — 17110 DESTRUCTION B9 LES UP TO 14: CPT | Performed by: NURSE PRACTITIONER

## 2023-06-02 PROCEDURE — 11900 INJECT SKIN LESIONS </W 7: CPT | Mod: 59 | Performed by: NURSE PRACTITIONER

## 2023-06-02 RX ORDER — CANDIDA ALBICANS 1000 [PNU]/ML
0.4 INJECTION, SOLUTION INTRADERMAL ONCE
Status: COMPLETED | OUTPATIENT
Start: 2023-06-02 | End: 2023-06-02

## 2023-06-02 RX ADMIN — CANDIDA ALBICANS 0.4 ML: 1000 INJECTION, SOLUTION INTRADERMAL at 08:02

## 2023-06-02 NOTE — PATIENT INSTRUCTIONS
Patient Education       Proper skin care from [facility]:    -Eliminate harsh soaps as they strip the natural oils from the skin, often resulting in dry itchy skin ( i.e. Dial, Zest, Yi Spring)  -Use mild soaps such as Cetaphil or Dove Sensitive Skin in the shower. You do not need to use soap on arms, legs, and trunk every time you shower unless visibly soiled.   -Avoid hot or cold showers.  -After showering, lightly dry off and apply moisturizing within 2-3 minutes. This will help trap moisture in the skin.   -Aggressive use of a moisturizer at least 1-2 times a day to the entire body (including -Vanicream, Cetaphil, Aquaphor or Cerave) and moisturize hands after every washing.  -We recommend using moisturizers that come in a tub that needs to be scooped out, not a pump. This has more of an oil base. It will hold moisture in your skin much better than a water base moisturizer. The above recommended are non-pore clogging.      Wear a sunscreen with at least SPF 30 on your face, ears, neck and V of the chest daily. Wear sunscreen on other areas of the body if those areas are exposed to the sun throughout the day. Sunscreens can contain physical and/or chemical blockers. Physical blockers are less likely to clog pores, these include zinc oxide and titanium dioxide. Reapply every two hour and after swimming.     Sunscreen examples: https://www.ewg.org/sunscreen/    UV radiation  UVA radiation remains constant throughout the day and throughout the year. It is a longer wavelength than UVB and therefore penetrates deeper into the skin leading to immediate and delayed tanning, photoaging, and skin cancer. 70-80% of UVA and UVB radiation occurs between the hours of 10am-2pm.  UVB radiation  UVB radiation causes the most harmful effects and is more significant during the summer months. However, snow and ice can reflect UVB radiation leading to skin damage during the winter months as well. UVB radiation is  responsible for tanning, burning, inflammation, delayed erythema (pinkness), pigmentation (brown spots), and skin cancer.     I recommend self monthly full body exams and yearly full body exams with a dermatology provider. If you develop a new or changing lesion please follow up for examination. Most skin cancers are pink and scaly or pink and pearly. However, we do see blue/brown/black skin cancers.  Consider the ABCDEs of melanoma when giving yourself your monthly full body exam ( don't forget the groin, buttocks, feet, toes, etc). A-asymmetry, B-borders, C-color, D-diameter, E-elevation or evolving. If you see any of these changes please follow up in clinic. If you cannot see your back I recommend purchasing a hand held mirror to use with a larger wall mirror.       Checking for Skin Cancer  You can find cancer early by checking your skin each month. There are 3 kinds of skin cancer. They are melanoma, basal cell carcinoma, and squamous cell carcinoma. Doing monthly skin checks is the best way to find new marks or skin changes. Follow the instructions below for checking your skin.   The ABCDEs of checking moles for melanoma   Check your moles or growths for signs of melanoma using ABCDE:   Asymmetry: the sides of the mole or growth don t match  Border: the edges are ragged, notched, or blurred  Color: the color within the mole or growth varies  Diameter: the mole or growth is larger than 6 mm (size of a pencil eraser)  Evolving: the size, shape, or color of the mole or growth is changing (evolving is not shown in the images below)    Checking for other types of skin cancer  Basal cell carcinoma or squamous cell carcinoma have symptoms such as:     A spot or mole that looks different from all other marks on your skin  Changes in how an area feels, such as itching, tenderness, or pain  Changes in the skin's surface, such as oozing, bleeding, or scaliness  A sore that does not heal  New swelling or redness beyond  the border of a mole    Who s at risk?  Anyone can get skin cancer. But you are at greater risk if you have:   Fair skin, light-colored hair, or light-colored eyes  Many moles or abnormal moles on your skin  A history of sunburns from sunlight or tanning beds  A family history of skin cancer  A history of exposure to radiation or chemicals  A weakened immune system  If you have had skin cancer in the past, you are at risk for recurring skin cancer.   How to check your skin  Do your monthly skin checkups in front of a full-length mirror. Check all parts of your body, including your:   Head (ears, face, neck, and scalp)  Torso (front, back, and sides)  Arms (tops, undersides, upper, and lower armpits)  Hands (palms, backs, and fingers, including under the nails)  Buttocks and genitals  Legs (front, back, and sides)  Feet (tops, soles, toes, including under the nails, and between toes)  If you have a lot of moles, take digital photos of them each month. Make sure to take photos both up close and from a distance. These can help you see if any moles change over time.   Most skin changes are not cancer. But if you see any changes in your skin, call your doctor right away. Only he or she can diagnose a problem. If you have skin cancer, seeing your doctor can be the first step toward getting the treatment that could save your life.   [employer] last reviewed this educational content on 4/1/2019 2000-2020 [employer]. [address] [place] [address], [place] [address]. All rights reserved. This information is not intended as a substitute for professional medical care. Always follow your healthcare professional's instructions.       When should I call my doctor?  If you are worsening or not improving, please, contact us or seek urgent care as noted below.     Who should I call with questions (adults)?  [facility] (adult and pediatric): [phone]  [facility]  [place] (adult): [phone]  [facility] ([place], [place], [place] and [place]) [phone]  For urgent needs outside of business hours call the [facility] at [phone] and ask for the dermatology resident on call to be paged  If this is a medical emergency and you are unable to reach an ER, Call 911      If you need a prescription refill, please contact your pharmacy. Refills are approved or denied by our Physicians during normal business hours, Monday through Fridays  Per office policy, refills will not be granted if you have not been seen within the past year (or sooner depending on your child's condition)

## 2023-06-02 NOTE — LETTER
6/2/2023         RE: Sonya Blackmon  [address] [id number]        Dear Colleague,    Thank you for referring your patient, Sonya Blackmon, to the [facility]. Please see a copy of my visit note below.    [facility] Dermatology Note  Encounter Date: Jun 2, 2023  Office Visit     Reviewed patients past medical history and pertinent chart review prior to patients visit today.     Dermatology Problem List:  Warts, bilateral feet   - Wart on right toe - cryotherpay 5/4/2023 resolved  - Wart on the left plantar foot - Cryo and candida injection - 5/4/2023, 06/02/23  ____________________________________________    Assessment & Plan:     # Verruca vulgaris x2, the rest have resolved  Counseled on natural history and viral etiology of this condition. Counseled that these are often recalcitrant to treatment. Discussed all treatment options that we offer as well as side effects of each. Advised that highest rates of success can be observed with combination monthly treatment in office and home treatments as well    - Cryotherapy: Patient declines cryotherapy on the left foot as these were just recently treated with cryotherapy and are  and healing.  Patient elects to treat warts today with cryotherapy. After verbal consent and discussion of risks and benefits including but no limited to dyspigmentation/scar, blister, and pain. A total of 2 warts were treated with 1-2mm freeze border for 3 cycle with liquid nitrogen. Post cryotherapy instructions were provided.     -Candida injection: patient has failed successive treatment with cryotherapy so I have recommended a series of intralesional immunotherapy with candida antigen.  This is a series of 3-4 injections and is about 70% effective.  Most patients don't see any improvement until after at least 2 injections.  After verbal consent was obtained, the skin was cleaned with an alcohol wipe  and 0.2 ml of candida (total of 0.4 ml) was injected under 2 lesions on the left plantar foot The patient tolerated the procedure well.     Follow-up: 1 month(s) in-person or prn if warts resolve    Written by Jacqueline Self working as a scribe for DAVID Bruce CNP on 06/02/23    Lesley Blakely CNP  Dermatology   _______________________________________    CC: No chief complaint on file.    HPI:  Ms. Sonya Blakcmon is a(n) 47 year old female who presents today as a return patient for warts on the feet.  Patient has noticed some improvement to the warts on her feet.  In the past, patient has tried using laser and cryotherapy.  The warts can be symptomatic and bothersome, but patients notes improvement.    Patient is otherwise feeling well, without additional skin concerns.    Physical Exam:  Vitals: There were no vitals taken for this visit.  SKIN: Focused examination of feet was performed.  -Verrucous papules on right foot is resolved  -Left foot: Residual 2 mm wart on the plantar ball of the foot x 2.  Papule on the great toe is resolved.  Third area on the ball of the foot is questionable if remaining     - No other lesions of concern on areas examined.     Medications:  Current Outpatient Medications   Medication     cimetidine (TAGAMET) 400 MG tablet     levonorgestrel (MIRENA) 20 MCG/24HR IUD     meloxicam (MOBIC) 15 MG tablet     metroNIDAZOLE (METROCREAM) 0.75 % external cream     Multiple Vitamin (DAILY MULTIVITAMIN PO)     Sulfacetamide Sodium 10 % LIQD     tretinoin (RETIN-A) 0.025 % external cream     vitamin D2 (ERGOCALCIFEROL) 88257 units (1250 mcg) capsule     ZENATANE 40 MG capsule     No current facility-administered medications for this visit.      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       Again, thank you for allowing me to participate in the care of your patient.        Sincerely,        Lizbet Blakely, DAVID CNP

## 2023-06-02 NOTE — PROGRESS NOTES
[facility] Dermatology Note  Encounter Date: Jun 2, 2023  Office Visit     Reviewed patients past medical history and pertinent chart review prior to patients visit today.     Dermatology Problem List:  Warts, bilateral feet   - Wart on right toe - cryotherpay 5/4/2023 resolved  - Wart on the left plantar foot - Cryo and candida injection - 5/4/2023, 06/02/23  ____________________________________________    Assessment & Plan:     # Verruca vulgaris x2, the rest have resolved  Counseled on natural history and viral etiology of this condition. Counseled that these are often recalcitrant to treatment. Discussed all treatment options that we offer as well as side effects of each. Advised that highest rates of success can be observed with combination monthly treatment in office and home treatments as well    - Cryotherapy: Patient declines cryotherapy on the left foot as these were just recently treated with cryotherapy and are  and healing.  Patient elects to treat warts today with cryotherapy. After verbal consent and discussion of risks and benefits including but no limited to dyspigmentation/scar, blister, and pain. A total of 2 warts were treated with 1-2mm freeze border for 3 cycle with liquid nitrogen. Post cryotherapy instructions were provided.     -Candida injection: patient has failed successive treatment with cryotherapy so I have recommended a series of intralesional immunotherapy with candida antigen.  This is a series of 3-4 injections and is about 70% effective.  Most patients don't see any improvement until after at least 2 injections.  After verbal consent was obtained, the skin was cleaned with an alcohol wipe and 0.2 ml of candida (total of 0.4 ml) was injected under 2 lesions on the left plantar foot The patient tolerated the procedure well.     Follow-up: 1 month(s) in-person or prn if warts resolve    Written by Jacqueline Self working as a scribe for Lizbet GRAMAJO  DAVID Blakely CNP on 06/02/23    Lesley Blakely CNP  Dermatology   _______________________________________    CC: No chief complaint on file.    HPI:  Ms. Sonya Blackmon is a(n) 47 year old female who presents today as a return patient for warts on the feet.  Patient has noticed some improvement to the warts on her feet.  In the past, patient has tried using laser and cryotherapy.  The warts can be symptomatic and bothersome, but patients notes improvement.    Patient is otherwise feeling well, without additional skin concerns.    Physical Exam:  Vitals: There were no vitals taken for this visit.  SKIN: Focused examination of feet was performed.  -Verrucous papules on right foot is resolved  -Left foot: Residual 2 mm wart on the plantar ball of the foot x 2.  Papule on the great toe is resolved.  Third area on the ball of the foot is questionable if remaining     - No other lesions of concern on areas examined.     Medications:  Current Outpatient Medications   Medication     cimetidine (TAGAMET) 400 MG tablet     levonorgestrel (MIRENA) 20 MCG/24HR IUD     meloxicam (MOBIC) 15 MG tablet     metroNIDAZOLE (METROCREAM) 0.75 % external cream     Multiple Vitamin (DAILY MULTIVITAMIN PO)     Sulfacetamide Sodium 10 % LIQD     tretinoin (RETIN-A) 0.025 % external cream     vitamin D2 (ERGOCALCIFEROL) 80489 units (1250 mcg) capsule     ZENATANE 40 MG capsule     No current facility-administered medications for this visit.      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

## 2023-06-30 ENCOUNTER — OFFICE VISIT (OUTPATIENT)
Dept: DERMATOLOGY | Facility: CLINIC | Age: 48
End: 2023-06-30
Payer: COMMERCIAL

## 2023-06-30 DIAGNOSIS — B07.0 PLANTAR WARTS: Primary | ICD-10-CM

## 2023-06-30 PROCEDURE — 17110 DESTRUCTION B9 LES UP TO 14: CPT | Performed by: NURSE PRACTITIONER

## 2023-06-30 NOTE — PROGRESS NOTES
[facility] Dermatology Note  Encounter Date: Jun 30, 2023  Office Visit     Reviewed patients past medical history and pertinent chart review prior to patients visit today.     Dermatology Problem List:  Warts, bilateral feet   - Wart on right toe - cryotherpay 5/4/2023 resolved  - Wart on the left plantar foot - Cryo and candida injection - 5/4/2023, 06/02/23. Cryo only. 6/30/2023   ____________________________________________    Assessment & Plan:     # Verruca vulgaris x3 left foot  Counseled on natural history and viral etiology of this condition. Counseled that these are often recalcitrant to treatment. Discussed all treatment options that we offer as well as side effects of each. Advised that highest rates of success can be observed with combination monthly treatment in office and home treatments as well    - Cryotherapy: Patient declines cryotherapy on the left foot as these were just recently treated with cryotherapy and are  and healing.  Patient elects to treat warts today with cryotherapy. After verbal consent and discussion of risks and benefits including but no limited to dyspigmentation/scar, blister, and pain. A total of 3 warts were treated with 1-2mm freeze border for 3 cycle with liquid nitrogen. Post cryotherapy instructions were provided.     Follow-up: 1 month(s) in-person or prn if warts resolve      Lesley Blakely CNP  Dermatology   _______________________________________    CC: Derm Problem (Wart remains to left foot-plantar wart. Pt OK with both cryo and inj if needed)    HPI:  Ms. Sonya Blackmon is a(n) 47 year old female who presents today as a return patient for warts on the feet.  Patient has noticed some improvement to the warts on her feet.  In the past, patient has tried using laser and cryotherapy.  The warts can be symptomatic and bothersome, but patients notes improvement.    Patient is otherwise feeling well, without additional skin  concerns.    Physical Exam:  Vitals: There were no vitals taken for this visit.  SKIN: Focused examination of feet was performed.  -Left foot: Residual 2 mm wart on the plantar ball of the foot x 2 and one at base of great toe.     - No other lesions of concern on areas examined.     Medications:  Current Outpatient Medications   Medication     levonorgestrel (MIRENA) 20 MCG/24HR IUD     Multiple Vitamin (DAILY MULTIVITAMIN PO)     cimetidine (TAGAMET) 400 MG tablet     meloxicam (MOBIC) 15 MG tablet     metroNIDAZOLE (METROCREAM) 0.75 % external cream     Sulfacetamide Sodium 10 % LIQD     tretinoin (RETIN-A) 0.025 % external cream     vitamin D2 (ERGOCALCIFEROL) 99168 units (1250 mcg) capsule     ZENATANE 40 MG capsule     No current facility-administered medications for this visit.      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

## 2023-06-30 NOTE — LETTER
6/30/2023         RE: Sonya Blackmon  [address] [facility] [id number]        Dear Colleague,    Thank you for referring your patient, Sonya Blackmon, to the [facility]. Please see a copy of my visit note below.    [facility] Dermatology Note  Encounter Date: Jun 30, 2023  Office Visit     Reviewed patients past medical history and pertinent chart review prior to patients visit today.     Dermatology Problem List:  Warts, bilateral feet   - Wart on right toe - cryotherpay 5/4/2023 resolved  - Wart on the left plantar foot - Cryo and candida injection - 5/4/2023, 06/02/23. Cryo only. 6/30/2023   ____________________________________________    Assessment & Plan:     # Verruca vulgaris x3 left foot  Counseled on natural history and viral etiology of this condition. Counseled that these are often recalcitrant to treatment. Discussed all treatment options that we offer as well as side effects of each. Advised that highest rates of success can be observed with combination monthly treatment in office and home treatments as well    - Cryotherapy: Patient declines cryotherapy on the left foot as these were just recently treated with cryotherapy and are  and healing.  Patient elects to treat warts today with cryotherapy. After verbal consent and discussion of risks and benefits including but no limited to dyspigmentation/scar, blister, and pain. A total of 3 warts were treated with 1-2mm freeze border for 3 cycle with liquid nitrogen. Post cryotherapy instructions were provided.     Follow-up: 1 month(s) in-person or prn if warts resolve      Lesley Blakely CNP  Dermatology   _______________________________________    CC: Derm Problem (Wart remains to left foot-plantar wart. Pt OK with both cryo and inj if needed)    HPI:  Ms. Sonya Blackmon is a(n) 47 year old female who presents today as a return patient for warts on the feet.  Patient has  noticed some improvement to the warts on her feet.  In the past, patient has tried using laser and cryotherapy.  The warts can be symptomatic and bothersome, but patients notes improvement.    Patient is otherwise feeling well, without additional skin concerns.    Physical Exam:  Vitals: There were no vitals taken for this visit.  SKIN: Focused examination of feet was performed.  -Left foot: Residual 2 mm wart on the plantar ball of the foot x 2 and one at base of great toe.     - No other lesions of concern on areas examined.     Medications:  Current Outpatient Medications   Medication     levonorgestrel (MIRENA) 20 MCG/24HR IUD     Multiple Vitamin (DAILY MULTIVITAMIN PO)     cimetidine (TAGAMET) 400 MG tablet     meloxicam (MOBIC) 15 MG tablet     metroNIDAZOLE (METROCREAM) 0.75 % external cream     Sulfacetamide Sodium 10 % LIQD     tretinoin (RETIN-A) 0.025 % external cream     vitamin D2 (ERGOCALCIFEROL) 72289 units (1250 mcg) capsule     ZENATANE 40 MG capsule     No current facility-administered medications for this visit.      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       Again, thank you for allowing me to participate in the care of your patient.        Sincerely,        Lizbet Blakely,  APRN CNP

## 2023-07-01 ENCOUNTER — HEALTH MAINTENANCE LETTER (OUTPATIENT)
Age: 48
End: 2023-07-01

## 2023-07-06 ENCOUNTER — OFFICE VISIT (OUTPATIENT)
Dept: FAMILY MEDICINE | Facility: CLINIC | Age: 48
End: 2023-07-06
Payer: COMMERCIAL

## 2023-07-06 VITALS
TEMPERATURE: 99.3 F | WEIGHT: 172 LBS | BODY MASS INDEX: 27.76 KG/M2 | SYSTOLIC BLOOD PRESSURE: 113 MMHG | HEART RATE: 61 BPM | DIASTOLIC BLOOD PRESSURE: 79 MMHG | OXYGEN SATURATION: 97 %

## 2023-07-06 DIAGNOSIS — Z71.84 TRAVEL ADVICE ENCOUNTER: Primary | ICD-10-CM

## 2023-07-06 PROCEDURE — 90717 YELLOW FEVER VACCINE SUBQ: CPT | Mod: GA | Performed by: NURSE PRACTITIONER

## 2023-07-06 PROCEDURE — 90691 TYPHOID VACCINE IM: CPT | Mod: GA | Performed by: NURSE PRACTITIONER

## 2023-07-06 PROCEDURE — 90472 IMMUNIZATION ADMIN EACH ADD: CPT | Mod: GA | Performed by: NURSE PRACTITIONER

## 2023-07-06 PROCEDURE — 90471 IMMUNIZATION ADMIN: CPT | Mod: GA | Performed by: NURSE PRACTITIONER

## 2023-07-06 PROCEDURE — 90636 HEP A/HEP B VACC ADULT IM: CPT | Mod: GA | Performed by: NURSE PRACTITIONER

## 2023-07-06 PROCEDURE — 99401 PREV MED CNSL INDIV APPRX 15: CPT | Mod: 25 | Performed by: NURSE PRACTITIONER

## 2023-07-06 RX ORDER — ATOVAQUONE AND PROGUANIL HYDROCHLORIDE 250; 100 MG/1; MG/1
1 TABLET, FILM COATED ORAL DAILY
Qty: 20 TABLET | Refills: 0 | Status: SHIPPED | OUTPATIENT
Start: 2023-07-06

## 2023-07-06 RX ORDER — AZITHROMYCIN 500 MG/1
500 TABLET, FILM COATED ORAL DAILY
Qty: 3 TABLET | Refills: 0 | Status: SHIPPED | OUTPATIENT
Start: 2023-07-06 | End: 2023-07-09

## 2023-07-06 NOTE — PATIENT INSTRUCTIONS
Thank you for visiting the [facility] : [phone]  Today July 6, 2023 you received the    Twinrix (Hepatitis A & B combo) Vaccine - Please return on 8/5/23 for your 2nd dose and 1/2/24 or later for your 3rd and final dose.    Yellow Fever (YF)    Typhoid - injectable. This vaccine is valid for two years.       08/03/2023    Twinrix #2 (Hep A and Hep B )  Tdap    Follow up vaccine appointments can be made as a NURSE ONLY visit at the Travel Clinic, (BE PREPARED TO WAIT, ) or at designated [facility].    If you are receiving the Rabies vaccines series, it is important that you follow the exact schedule ordered.     Pre-travel     We recommend that you purchase Medical Evacuation Insurance prior to your departure.  Https://wwwnc.cdc.gov/travel/page/insurance    [place] your travel plans with the  Department of [employer] through STEP ( Smart Traveler Enrollment Program ) https://step.[employer].gov.  STEP is a free service to allow U[place] citizens and nationals traveling and living abroad to enroll their trip with the nearest U.[place]. Embassy or Consulate.    Animal Exposure: Avoid all mammals even if they look healthy.  If there is a bite, scratch or even a lick, wash area immediately with soap and water for 15 minutes and seek medical care within 24 hours for evaluation of Rabies post exposure treatment.  Contact your Medical Evacuation Insurance.    Repiratory illness prevention strategies ( Covid and Influenza ) CDC recommendations:  Consider wearing a mask in crowded or poorly ventilated indoor areas, including on public transportation and in transportation hubs.  Hand washing: frequent, thorough handwashing with soap and water for 20 seconds. Use an alcohol-based hand  with at least 60% alcohol if soap and water are not readily available. Avoid touching face, nose, eyes, mouth unless you have done appropriate hand washing as above.  VACCINES: Staying up to date on  COVID-19 vaccines significantly lowers the risk of getting very sick, being hospitalized, or dying from COVID-19. CDC recommends that everyone stay up to date on their COVID-19 vaccines, especially people with weakened immune systems.    Travel Covid 19 Testing:  updated 12/06/2021  International travelers: Pre-travel:  See country specific Embassy websites or airline websites.    Post travel: CDC recommends getting tested 3-5 days after your trip     Post-travel illness:  Contact your provider or [facility] if you develop a fever, rash, cough, diarrhea or other symptoms for up to 1 year after travel.  Inform your healthcare provider when and where you traveled to.    Please call the [facility] with any questions [phone]  Or send your provider a 'My Chart' note.           Name band;

## 2023-07-06 NOTE — PROGRESS NOTES
Nurse Note ( Pre-Travel Consult)      Itinerary:  [facility]      Departure Date: 8/24/23      Return Date: 9/2/23      Length of Trip 2 weeks      Reason for Travel: Tourism           Urban or rural: both      Accommodations: Hotel        IMMUNIZATION HISTORY  Have you received any immunizations within the past 4 weeks?  No  Have you ever fainted from having your blood drawn or from an injection?  No  Have you ever had a fever reaction to vaccination?  No  Have you ever had any bad reaction or side effect from any vaccination?  No  Have you ever had hepatitis A or B vaccine?  unsure  Do you live (or work closely) with anyone who has AIDS, an AIDS-like condition, any other immune disorder or who is on chemotherapy for cancer?  No  Do you have a family history of immunodeficiency?  No  Have you received any injection of immune globulin or any blood products during the past 12 months?  No    Patient roomed by David Cote  Sonya Blackmon is a 47 year old female seen today with family members for counsultation for international travel.   Patient will be departing in  7 week(s) and  traveling with family member(s).      Patient itinerary :  will be in the urban region of [facility] for 3 days > [place] (beach) and then [place]   which risk for Malaria, Yellow Fever and food borne illnesses. exposure.      Patient's activities will include sightseeing, safari/game hays and beach activities (salt water).    Patient's country of birth is [place]    Special medical concerns: none  Pre-travel questionnaire was completed by patient and reviewed by provider.     Vitals: /79   Pulse 61   Temp 99.3  F (37.4  C)   Wt 78 kg (172 lb)   LMP 06/15/2023   SpO2 97%   BMI 27.76 kg/m    BMI= Body mass index is 27.76 kg/m .    EXAM:  General:  Well-nourished, well-developed in no acute distress.  Appears to be stated age, interacts appropriately and expresses understanding of information given to  patient.    Current Outpatient Medications   Medication Sig Dispense Refill     levonorgestrel (MIRENA) 20 MCG/24HR IUD 1 each (20 mcg) by Intrauterine route once       cimetidine (TAGAMET) 400 MG tablet Take 1 tablet (400 mg) by mouth 2 times daily (Patient not taking: Reported on 5/4/2023) 60 tablet 2     meloxicam (MOBIC) 15 MG tablet Take 0.5-1 tablets (7.5-15 mg) by mouth daily as needed for pain (Patient not taking: Reported on 6/30/2023) 30 tablet 1     metroNIDAZOLE (METROCREAM) 0.75 % external cream  (Patient not taking: Reported on 12/5/2022)       Multiple Vitamin (DAILY MULTIVITAMIN PO) Take  by mouth.       Sulfacetamide Sodium 10 % LIQD  (Patient not taking: Reported on 12/5/2022)       tretinoin (RETIN-A) 0.025 % external cream  (Patient not taking: Reported on 5/4/2023)       vitamin D2 (ERGOCALCIFEROL) 37942 units (1250 mcg) capsule Take 1 capsule (50,000 Units) by mouth once a week (Patient not taking: Reported on 5/4/2023) 12 capsule 3     ZENATANE 40 MG capsule  (Patient not taking: Reported on 5/4/2023)       Patient Active Problem List   Diagnosis     Sprain of pelvis     Elevated liver function tests     CARDIOVASCULAR SCREENING; LDL GOAL LESS THAN 160     Decreased libido     Depression     Dyspareunia     Dyspareunia, psychogenic     Hypoactive sexual desire disorder     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     Elevated dehydroepiandrosterone sulfate level     Acute left-sided low back pain without sciatica     Lumbago     Allergies   Allergen Reactions     No Known Drug Allergy          Immunizations discussed include:   Covid 19: Up to date  Hepatitis A:  Twin Jenna series started today  Hepatitis B: Twin Jenna series started today  Influenza: vaccine is not available  Typhoid: Ordered/given today, risks, benefits and side effects reviewed  Rabies: Declined  reviewed managment of a animal bite or scratch (washing wound, seek medical care within 24 hours for post exposure prophylaxis )  Yellow Fever:  Yellow Fever ordered/given today - side effects, precautions, allergies, risks discussed. Patient expressed understanding.  [place] Encephalitis: Not indicated  Meningococcus: Not indicated  Tetanus/Diphtheria: future order placed  Measles/Mumps/Rubella: [facility]  Cholera: Not available  Polio: Up to date  Pneumococcal: Under age of 65  Varicella: Immune by disease history per patient report  Shingrix: Not indicated  HPV:  Not indicated     TB: low risk     Altitude Exposure on this trip: no  Past tolerance to Altitude: na    ASSESSMENT/PLAN:  Sonya was seen today for travel clinic.    Diagnoses and all orders for this visit:    Travel advice encounter  -     atovaquone-proguanil (MALARONE) 250-100 MG tablet; Take 1 tablet by mouth daily Start 2 days before exposure to Malaria and continue daily till  7 days after exposure.  -     azithromycin (ZITHROMAX) 500 MG tablet; Take 1 tablet (500 mg) by mouth daily for 3 doses Take 1 tablet a day for up to 3 days for severe diarrhea    Other orders  -     YELLOW FEVER, LIVE SQ  -     TYPHOID VACCINE, IM  -     HEP A & B (TWINRIX); Standing  -     TDAP 10-64Y (ADACEL,BOOSTRIX); Future  -     HEP A & B (TWINRIX)      I have reviewed general recommendations for safe travel   including: food/water precautions, insect precautions, roadway safety. Educational materials and [employer] report provided.    Malaraia prophylaxis recommended: Malarone  Symptomatic treatment for traveler's diarrhea: azithromycin        Evacuation insurance advised and resources were provided to patient.    Total visit time 20 minutes  with over 50% of time spent counseling patient and shared decision making as detailed above.    Kusum Iyer CNP  Certificate in Travel Health

## 2023-08-04 ENCOUNTER — ALLIED HEALTH/NURSE VISIT (OUTPATIENT)
Dept: FAMILY MEDICINE | Facility: CLINIC | Age: 48
End: 2023-08-04
Payer: COMMERCIAL

## 2023-08-04 DIAGNOSIS — Z23 ENCOUNTER FOR IMMUNIZATION: Primary | ICD-10-CM

## 2023-08-04 PROCEDURE — 90636 HEP A/HEP B VACC ADULT IM: CPT

## 2023-08-04 PROCEDURE — 90472 IMMUNIZATION ADMIN EACH ADD: CPT

## 2023-08-04 PROCEDURE — 99207 PR NO CHARGE NURSE ONLY: CPT

## 2023-08-04 PROCEDURE — 90471 IMMUNIZATION ADMIN: CPT

## 2023-08-04 PROCEDURE — 90715 TDAP VACCINE 7 YRS/> IM: CPT

## 2023-08-04 NOTE — PROGRESS NOTES
Prior to immunization administration, verified patients identity using patient s name and date of birth. Please see Immunization Activity for additional information.     Screening Questionnaire for Adult Immunization    Are you sick today?   No   Do you have allergies to medications, food, a vaccine component or latex?   No   Have you ever had a serious reaction after receiving a vaccination?   No   Do you have a long-term health problem with heart, lung, kidney, or metabolic disease (e.g., diabetes), asthma, a blood disorder, no spleen, complement component deficiency, a cochlear implant, or a spinal fluid leak?  Are you on long-term aspirin therapy?   No   Do you have cancer, leukemia, HIV/AIDS, or any other immune system problem?   No   Do you have a parent, brother, or sister with an immune system problem?   No   In the past 3 months, have you taken medications that affect  your immune system, such as prednisone, other steroids, or anticancer drugs; drugs for the treatment of rheumatoid arthritis, Crohn s disease, or psoriasis; or have you had radiation treatments?   No   Have you had a seizure, or a brain or other nervous system problem?   No   During the past year, have you received a transfusion of blood or blood    products, or been given immune (gamma) globulin or antiviral drug?   No   For women: Are you pregnant or is there a chance you could become       pregnant during the next month?   No   Have you received any vaccinations in the past 4 weeks?   No     Immunization questionnaire answers were all negative.    I have reviewed the following standing orders:     This patient is due and qualifies for the Hepatitis A & B vaccine.    Click here for HEP A & B STANDING ORDER    I have reviewed the vaccines inclusion and exclusion criteria; No concerns regarding eligibility.         This patient is due and qualifies for a TDAP vaccine.    Click here for Tdap Standing Order    I have reviewed the vaccines  inclusion and exclusion criteria; No concerns regarding eligibility.     Patient instructed to remain in clinic for 15 minutes afterwards, and to report any adverse reactions.     Screening performed by Bita Maxwell on 8/4/2023 at 1:26 PM.

## 2023-08-14 NOTE — NURSING NOTE
"Sonya Blackmon's goals for this visit include:   Chief Complaint   Patient presents with     Consult     Elevated dehydroepiandrosterone       She requests these members of her care team be copied on today's visit information: [facility]      [facility]    Referring Provider:  Heather Delgado MD  [facility]  [address] [facility] [address]  [place] [address]    Chief Complaint   Patient presents with     Consult     Elevated dehydroepiandrosterone       Initial /65 mmHg  Pulse 74  Ht 1.676 m (5' 6\")  Wt 69.219 kg (152 lb 9.6 oz)  BMI 24.64 kg/m2  LMP 01/15/2017 (Approximate) Estimated body mass index is 24.64 kg/(m^2) as calculated from the following:    Height as of this encounter: 1.676 m (5' 6\").    Weight as of this encounter: 69.219 kg (152 lb 9.6 oz).  Medication Reconciliation: complete    Do you need any medication refills at today's visit? No    Akua Jade LPN     Chief Complaint   Patient presents with     Consult     Elevated dehydroepiandrosterone       " Humira Counseling:  I discussed with the patient the risks of adalimumab including but not limited to myelosuppression, immunosuppression, autoimmune hepatitis, demyelinating diseases, lymphoma, and serious infections.  The patient understands that monitoring is required including a PPD at baseline and must alert us or the primary physician if symptoms of infection or other concerning signs are noted.

## 2023-08-23 ENCOUNTER — PATIENT OUTREACH (OUTPATIENT)
Dept: CARE COORDINATION | Facility: CLINIC | Age: 48
End: 2023-08-23
Payer: COMMERCIAL

## 2023-09-15 ENCOUNTER — OFFICE VISIT (OUTPATIENT)
Dept: DERMATOLOGY | Facility: CLINIC | Age: 48
End: 2023-09-15
Payer: COMMERCIAL

## 2023-09-15 DIAGNOSIS — B07.0 PLANTAR WARTS: Primary | ICD-10-CM

## 2023-09-15 PROCEDURE — 17110 DESTRUCTION B9 LES UP TO 14: CPT | Performed by: NURSE PRACTITIONER

## 2023-09-15 RX ORDER — IMIQUIMOD 12.5 MG/.25G
CREAM TOPICAL
Qty: 12 PACKET | Refills: 3 | Status: SHIPPED | OUTPATIENT
Start: 2023-09-15

## 2023-09-15 RX ORDER — VALACYCLOVIR HYDROCHLORIDE 1 G/1
1000 TABLET, FILM COATED ORAL DAILY
Qty: 30 TABLET | Refills: 0 | Status: SHIPPED | OUTPATIENT
Start: 2023-09-15 | End: 2023-10-20

## 2023-09-15 NOTE — LETTER
9/15/2023         RE: Sonya Blackmon  [address] [facility] [facility] [address]        Dear Colleague,    Thank you for referring your patient, Sonya Blackmon, to the [facility]. Please see a copy of my visit note below.    [facility] Dermatology Note  Encounter Date: Sep 15, 2023  Office Visit     Reviewed patients past medical history and pertinent chart review prior to patients visit today.     Dermatology Problem List:  Warts, bilateral feet   - Wart on right toe - cryotherpay 5/4/2023 resolved  - Wart on the left plantar foot - Cryo and candida injection - 5/4/2023, 06/02/23. Cryo only. 6/30/2023   ____________________________________________    Assessment & Plan:     # Verruca vulgaris, patient only wants the two at base of great toe treated today.   Counseled on natural history and viral etiology of this condition. Counseled that these are often recalcitrant to treatment. Discussed all treatment options that we offer as well as side effects of each. Advised that highest rates of success can be observed with combination monthly treatment in office and home treatments as well    - Cryotherapy: Patient declines cryotherapy on the left foot as these were just recently treated with cryotherapy and are  and healing.  Patient elects to treat 2 warts on the base of the left great toe today with cryotherapy. After verbal consent and discussion of risks and benefits including but no limited to dyspigmentation/scar, blister, and pain. A total of 2 warts were treated with 1-2mm freeze border for 3 cycle with liquid nitrogen. Post cryotherapy instructions were provided.     -At patient's request a trial of valacyclovir 1000 mg daily x30 days given today.  She has read some case studies and brings them in today where patients have had resolution of plantars warts with this treatment.  We discussed side effects and patient understands risks.  She  understands this is an off-label use of the medication and would like to try it.    -Patient will also begin applying imiquimod cream nightly to the warts as tolerates.  She will apply salicylic acid at the same time and cover with duct tape.  Repeat nightly.    Follow-up: 1 month(s) in-person or prn if warts resolve      Lesley Blakely CNP  Dermatology   _______________________________________    CC: Derm Problem (Plantar wart )    HPI:  Ms. Sonya Blackmon is a(n) 47 year old female who presents today as a return patient for warts on the feet.  Patient has been seeing an outside provider I think in podiatry for treatment with what sounds like cantharidin.  She has had resolution of warts on the right foot but continues to struggle with warts on the left foot that are not resolving and new one starting.  She is very tearful at her appointment again today and states that her children now have warts and she is very frustrated with them.  They are very painful and symptomatic.  She brings in a case that he today of patients who had full resolution of plantars warts with use of acyclovir 1000 mg daily x2 months.  She would like to do a trial of this medication.  She understands the risks.  She had the warts on the left foot treated with cantharidin just a few days ago but says that the provider missed 2 of them as his eyesight is not very good.  She would like these treated with cryotherapy today by me.  She declines treatment of the other warts.  He  Patient is otherwise feeling well, without additional skin concerns.    Physical Exam:  Vitals: There were no vitals taken for this visit.  SKIN: Focused examination of feet was performed.  -Left foot: several 2-4 mm wart on the plantar ball of the left foot and x 2 at base of great toe.     - No other lesions of concern on areas examined.     Medications:  Current Outpatient Medications   Medication     imiquimod (ALDARA) 5 % external cream     valACYclovir (VALTREX)  1000 mg tablet     atovaquone-proguanil (MALARONE) 250-100 MG tablet     cimetidine (TAGAMET) 400 MG tablet     levonorgestrel (MIRENA) 20 MCG/24HR IUD     meloxicam (MOBIC) 15 MG tablet     metroNIDAZOLE (METROCREAM) 0.75 % external cream     Multiple Vitamin (DAILY MULTIVITAMIN PO)     Sulfacetamide Sodium 10 % LIQD     tretinoin (RETIN-A) 0.025 % external cream     vitamin D2 (ERGOCALCIFEROL) 79207 units (1250 mcg) capsule     No current facility-administered medications for this visit.      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       Again, thank you for allowing me to participate in the care of your patient.        Sincerely,        Lizbet Blakely, DAVID CNP

## 2023-09-15 NOTE — PROGRESS NOTES
[facility] Dermatology Note  Encounter Date: Sep 15, 2023  Office Visit     Reviewed patients past medical history and pertinent chart review prior to patients visit today.     Dermatology Problem List:  Warts, bilateral feet   - Wart on right toe - cryotherpay 5/4/2023 resolved  - Wart on the left plantar foot - Cryo and candida injection - 5/4/2023, 06/02/23. Cryo only. 6/30/2023   ____________________________________________    Assessment & Plan:     # Verruca vulgaris, patient only wants the two at base of great toe treated today.   Counseled on natural history and viral etiology of this condition. Counseled that these are often recalcitrant to treatment. Discussed all treatment options that we offer as well as side effects of each. Advised that highest rates of success can be observed with combination monthly treatment in office and home treatments as well    - Cryotherapy: Patient declines cryotherapy on the left foot as these were just recently treated with cryotherapy and are  and healing.  Patient elects to treat 2 warts on the base of the left great toe today with cryotherapy. After verbal consent and discussion of risks and benefits including but no limited to dyspigmentation/scar, blister, and pain. A total of 2 warts were treated with 1-2mm freeze border for 3 cycle with liquid nitrogen. Post cryotherapy instructions were provided.     -At patient's request a trial of valacyclovir 1000 mg daily x30 days given today.  She has read some case studies and brings them in today where patients have had resolution of plantars warts with this treatment.  We discussed side effects and patient understands risks.  She understands this is an off-label use of the medication and would like to try it.    -Patient will also begin applying imiquimod cream nightly to the warts as tolerates.  She will apply salicylic acid at the same time and cover with duct tape.  Repeat  nightly.    Follow-up: 1 month(s) in-person or prn if warts resolve      Lesley Blakely CNP  Dermatology   _______________________________________    CC: Derm Problem (Plantar wart )    HPI:  Ms. Sonya Blackmon is a(n) 47 year old female who presents today as a return patient for warts on the feet.  Patient has been seeing an outside provider I think in podiatry for treatment with what sounds like cantharidin.  She has had resolution of warts on the right foot but continues to struggle with warts on the left foot that are not resolving and new one starting.  She is very tearful at her appointment again today and states that her children now have warts and she is very frustrated with them.  They are very painful and symptomatic.  She brings in a case that he today of patients who had full resolution of plantars warts with use of acyclovir 1000 mg daily x2 months.  She would like to do a trial of this medication.  She understands the risks.  She had the warts on the left foot treated with cantharidin just a few days ago but says that the provider missed 2 of them as his eyesight is not very good.  She would like these treated with cryotherapy today by me.  She declines treatment of the other warts.  He  Patient is otherwise feeling well, without additional skin concerns.    Physical Exam:  Vitals: There were no vitals taken for this visit.  SKIN: Focused examination of feet was performed.  -Left foot: several 2-4 mm wart on the plantar ball of the left foot and x 2 at base of great toe.     - No other lesions of concern on areas examined.     Medications:  Current Outpatient Medications   Medication    imiquimod (ALDARA) 5 % external cream    valACYclovir (VALTREX) 1000 mg tablet    atovaquone-proguanil (MALARONE) 250-100 MG tablet    cimetidine (TAGAMET) 400 MG tablet    levonorgestrel (MIRENA) 20 MCG/24HR IUD    meloxicam (MOBIC) 15 MG tablet    metroNIDAZOLE (METROCREAM) 0.75 % external cream    Multiple  Vitamin (DAILY MULTIVITAMIN PO)    Sulfacetamide Sodium 10 % LIQD    tretinoin (RETIN-A) 0.025 % external cream    vitamin D2 (ERGOCALCIFEROL) 90828 units (1250 mcg) capsule     No current facility-administered medications for this visit.      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

## 2023-09-20 ENCOUNTER — PATIENT OUTREACH (OUTPATIENT)
Dept: CARE COORDINATION | Facility: CLINIC | Age: 48
End: 2023-09-20
Payer: COMMERCIAL

## 2023-10-20 ENCOUNTER — OFFICE VISIT (OUTPATIENT)
Dept: DERMATOLOGY | Facility: CLINIC | Age: 48
End: 2023-10-20
Payer: COMMERCIAL

## 2023-10-20 DIAGNOSIS — B07.0 PLANTAR WARTS: ICD-10-CM

## 2023-10-20 PROCEDURE — 17110 DESTRUCTION B9 LES UP TO 14: CPT | Performed by: NURSE PRACTITIONER

## 2023-10-20 RX ORDER — VALACYCLOVIR HYDROCHLORIDE 1 G/1
1000 TABLET, FILM COATED ORAL DAILY
Qty: 30 TABLET | Refills: 0 | Status: SHIPPED | OUTPATIENT
Start: 2023-10-20

## 2023-10-20 NOTE — LETTER
10/20/2023         RE: Sonya Blackmon  [address] [facility] [address]        Dear Colleague,    Thank you for referring your patient, Sonya Blackmon, to the [facility]. Please see a copy of my visit note below.    [facility] Dermatology Note  Encounter Date: Oct 20, 2023  Office Visit     Reviewed patients past medical history and pertinent chart review prior to patients visit today.     Dermatology Problem List:  Warts, bilateral feet   - Wart on right toe - cryotherpay 5/4/2023 resolved  - Wart on the left plantar foot - Cryo and candida injection - 5/4/2023, 06/02/23. Cryo only. 6/30/2023, 10/20/2023   - valacyclovir 1 gram daily x 2 months at patients request  ____________________________________________    Assessment & Plan:     # Verruca vulgaris, 3 still active, others resolved  Counseled on natural history and viral etiology of this condition. Counseled that these are often recalcitrant to treatment. Discussed all treatment options that we offer as well as side effects of each. Advised that highest rates of success can be observed with combination monthly treatment in office and home treatments as well    - Cryotherapy: Patient declines cryotherapy on the left foot as these were just recently treated with cryotherapy and are  and healing.  Patient elects to treat 3 residual warts today with cryotherapy. After verbal consent and discussion of risks and benefits including but no limited to dyspigmentation/scar, blister, and pain. A total of 3 warts were treated with 1-2mm freeze border for 3 cycle with liquid nitrogen. Post cryotherapy instructions were provided.     -At patient's request a refill of valacyclovir 1000 mg daily x30 days given today. Stop after 30 days. She has read some case studies and brings them in today where patients have had resolution of plantars warts with this treatment.  We discussed side effects and  patient understands risks.  She understands this is an off-label use of the medication and would like to try it.    -Patient will also begin applying imiquimod cream nightly to the warts as tolerates.  She will apply salicylic acid at the same time and cover with duct tape.  Repeat nightly for 1 month then stop and allow to fully heal.    Follow-up: PRN if warts persist or recur.       Lesley Blakely, BALDEV  Dermatology   _______________________________________    CC: Wart (X3 on left foot, cryo last time helped a little )    HPI:  Ms. Sonya Blackmon is a(n) 47 year old female who presents today as a return patient for warts on the feet. She has seen improvements in the warts and thinks some may have resolved. She has been applying imiquimid and salicylic acid on them nightly and has been taking valacyclovir 1 gram daily without side effects. She is happy with the improvement from this month.     Patient is otherwise feeling well, without additional skin concerns.    Physical Exam:  Vitals: There were no vitals taken for this visit.  SKIN: Focused examination of feet was performed.  -Left foot: 3 small 1 mm verrucous papules on the pad of foot. Others have fully resolved with intact skin lines.     - No other lesions of concern on areas examined.     Medications:  Current Outpatient Medications   Medication     valACYclovir (VALTREX) 1000 mg tablet     atovaquone-proguanil (MALARONE) 250-100 MG tablet     cimetidine (TAGAMET) 400 MG tablet     imiquimod (ALDARA) 5 % external cream     levonorgestrel (MIRENA) 20 MCG/24HR IUD     meloxicam (MOBIC) 15 MG tablet     metroNIDAZOLE (METROCREAM) 0.75 % external cream     Multiple Vitamin (DAILY MULTIVITAMIN PO)     Sulfacetamide Sodium 10 % LIQD     tretinoin (RETIN-A) 0.025 % external cream     vitamin D2 (ERGOCALCIFEROL) 66957 units (1250 mcg) capsule     No current facility-administered medications for this visit.      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       Again, thank you for allowing me to participate in the care of your patient.        Sincerely,        DAVID Young CNP

## 2023-10-20 NOTE — PROGRESS NOTES
[facility] Dermatology Note  Encounter Date: Oct 20, 2023  Office Visit     Reviewed patients past medical history and pertinent chart review prior to patients visit today.     Dermatology Problem List:  Warts, bilateral feet   - Wart on right toe - cryotherpay 5/4/2023 resolved  - Wart on the left plantar foot - Cryo and candida injection - 5/4/2023, 06/02/23. Cryo only. 6/30/2023, 10/20/2023   - valacyclovir 1 gram daily x 2 months at patients request  ____________________________________________    Assessment & Plan:     # Verruca vulgaris, 3 still active, others resolved  Counseled on natural history and viral etiology of this condition. Counseled that these are often recalcitrant to treatment. Discussed all treatment options that we offer as well as side effects of each. Advised that highest rates of success can be observed with combination monthly treatment in office and home treatments as well    - Cryotherapy: Patient declines cryotherapy on the left foot as these were just recently treated with cryotherapy and are  and healing.  Patient elects to treat 3 residual warts today with cryotherapy. After verbal consent and discussion of risks and benefits including but no limited to dyspigmentation/scar, blister, and pain. A total of 3 warts were treated with 1-2mm freeze border for 3 cycle with liquid nitrogen. Post cryotherapy instructions were provided.     -At patient's request a refill of valacyclovir 1000 mg daily x30 days given today. Stop after 30 days. She has read some case studies and brings them in today where patients have had resolution of plantars warts with this treatment.  We discussed side effects and patient understands risks.  She understands this is an off-label use of the medication and would like to try it.    -Patient will also begin applying imiquimod cream nightly to the warts as tolerates.  She will apply salicylic acid at the same time and cover with  duct tape.  Repeat nightly for 1 month then stop and allow to fully heal.    Follow-up: PRN if warts persist or recur.       Lesley Blakely, [facility]  Dermatology   _______________________________________    CC: Wart (X3 on left foot, cryo last time helped a little )    HPI:  Ms. Sonya Blackmon is a(n) 47 year old female who presents today as a return patient for warts on the feet. She has seen improvements in the warts and thinks some may have resolved. She has been applying imiquimid and salicylic acid on them nightly and has been taking valacyclovir 1 gram daily without side effects. She is happy with the improvement from this month.     Patient is otherwise feeling well, without additional skin concerns.    Physical Exam:  Vitals: There were no vitals taken for this visit.  SKIN: Focused examination of feet was performed.  -Left foot: 3 small 1 mm verrucous papules on the pad of foot. Others have fully resolved with intact skin lines.     - No other lesions of concern on areas examined.     Medications:  Current Outpatient Medications   Medication    valACYclovir (VALTREX) 1000 mg tablet    atovaquone-proguanil (MALARONE) 250-100 MG tablet    cimetidine (TAGAMET) 400 MG tablet    imiquimod (ALDARA) 5 % external cream    levonorgestrel (MIRENA) 20 MCG/24HR IUD    meloxicam (MOBIC) 15 MG tablet    metroNIDAZOLE (METROCREAM) 0.75 % external cream    Multiple Vitamin (DAILY MULTIVITAMIN PO)    Sulfacetamide Sodium 10 % LIQD    tretinoin (RETIN-A) 0.025 % external cream    vitamin D2 (ERGOCALCIFEROL) 36063 units (1250 mcg) capsule     No current facility-administered medications for this visit.      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

## 2023-10-25 ENCOUNTER — OFFICE VISIT (OUTPATIENT)
Dept: MIDWIFE SERVICES | Facility: CLINIC | Age: 48
End: 2023-10-25
Payer: COMMERCIAL

## 2023-10-25 VITALS
SYSTOLIC BLOOD PRESSURE: 110 MMHG | HEART RATE: 81 BPM | DIASTOLIC BLOOD PRESSURE: 71 MMHG | BODY MASS INDEX: 27.28 KG/M2 | WEIGHT: 169 LBS | TEMPERATURE: 97.8 F

## 2023-10-25 DIAGNOSIS — Z78.9 H/O FOREIGN TRAVEL: ICD-10-CM

## 2023-10-25 DIAGNOSIS — Z13.0 SCREENING FOR IRON DEFICIENCY ANEMIA: ICD-10-CM

## 2023-10-25 DIAGNOSIS — Z13.29 SCREENING FOR THYROID DISORDER: Primary | ICD-10-CM

## 2023-10-25 DIAGNOSIS — Z13.220 SCREENING FOR CHOLESTEROL LEVEL: ICD-10-CM

## 2023-10-25 DIAGNOSIS — Z97.5 IUD (INTRAUTERINE DEVICE) IN PLACE: ICD-10-CM

## 2023-10-25 DIAGNOSIS — Z12.31 ENCOUNTER FOR SCREENING MAMMOGRAM FOR MALIGNANT NEOPLASM OF BREAST: ICD-10-CM

## 2023-10-25 DIAGNOSIS — Z13.1 SCREENING FOR DIABETES MELLITUS: ICD-10-CM

## 2023-10-25 DIAGNOSIS — Z01.419 WELL WOMAN EXAM WITH ROUTINE GYNECOLOGICAL EXAM: ICD-10-CM

## 2023-10-25 DIAGNOSIS — Z12.11 SCREENING FOR COLON CANCER: ICD-10-CM

## 2023-10-25 PROBLEM — E78.00 ELEVATED CHOLESTEROL: Status: ACTIVE | Noted: 2023-10-25

## 2023-10-25 LAB
CHOLEST SERPL-MCNC: 220 MG/DL
ERYTHROCYTE [DISTWIDTH] IN BLOOD BY AUTOMATED COUNT: 12.8 % (ref 10–15)
HBA1C MFR BLD: 5.5 % (ref 0–5.6)
HCT VFR BLD AUTO: 39.1 % (ref 35–47)
HDLC SERPL-MCNC: 37 MG/DL
HGB BLD-MCNC: 12.9 G/DL (ref 11.7–15.7)
MALARIA SMEAR BLD: NEGATIVE
MCH RBC QN AUTO: 31.2 PG (ref 26.5–33)
MCHC RBC AUTO-ENTMCNC: 33 G/DL (ref 31.5–36.5)
MCV RBC AUTO: 95 FL (ref 78–100)
PLASMODIUM AG BLD QL IA: NEGATIVE
PLASMODIUM AG BLD QL IA: NEGATIVE
PLATELET # BLD AUTO: 251 10E3/UL (ref 150–450)
RBC # BLD AUTO: 4.13 10E6/UL (ref 3.8–5.2)
TSH SERPL DL<=0.005 MIU/L-ACNC: 1.95 UIU/ML (ref 0.3–4.2)
WBC # BLD AUTO: 6.1 10E3/UL (ref 4–11)

## 2023-10-25 PROCEDURE — 99396 PREV VISIT EST AGE 40-64: CPT | Performed by: ADVANCED PRACTICE MIDWIFE

## 2023-10-25 PROCEDURE — 83036 HEMOGLOBIN GLYCOSYLATED A1C: CPT | Performed by: ADVANCED PRACTICE MIDWIFE

## 2023-10-25 PROCEDURE — 85027 COMPLETE CBC AUTOMATED: CPT | Performed by: ADVANCED PRACTICE MIDWIFE

## 2023-10-25 PROCEDURE — 84443 ASSAY THYROID STIM HORMONE: CPT | Performed by: ADVANCED PRACTICE MIDWIFE

## 2023-10-25 PROCEDURE — 36415 COLL VENOUS BLD VENIPUNCTURE: CPT | Performed by: ADVANCED PRACTICE MIDWIFE

## 2023-10-25 PROCEDURE — 83718 ASSAY OF LIPOPROTEIN: CPT | Performed by: ADVANCED PRACTICE MIDWIFE

## 2023-10-25 PROCEDURE — 82465 ASSAY BLD/SERUM CHOLESTEROL: CPT | Performed by: ADVANCED PRACTICE MIDWIFE

## 2023-10-25 PROCEDURE — 87899 AGENT NOS ASSAY W/OPTIC: CPT | Performed by: ADVANCED PRACTICE MIDWIFE

## 2023-10-25 ASSESSMENT — ENCOUNTER SYMPTOMS
ABDOMINAL PAIN: 0
CHILLS: 0
EYE PAIN: 0
NERVOUS/ANXIOUS: 0
HEMATOCHEZIA: 0
CONSTIPATION: 0
DIARRHEA: 0
DIZZINESS: 0
COUGH: 0
HEMATURIA: 0

## 2023-10-25 NOTE — PATIENT INSTRUCTIONS
A Healthy Lifestyle: Care Instructions  Your Care Instructions    A healthy lifestyle can help you feel good, stay at a healthy weight, and have plenty of energy for both work and play. A healthy lifestyle is something you can share with your whole family.  A healthy lifestyle also can lower your risk for serious health problems, such as high blood pressure, heart disease, and diabetes.    You can follow a few steps listed below to improve your health and the health of your family.    Follow-up care is a key part of your treatment and safety. Be sure to make and go to all appointments, and call your doctor if you are having problems. It's also a good idea to know your test results and keep a list of the medicines you take.    How can you care for yourself at home?            Do not eat too much sugar, fat, or fast foods. You can still have dessert and treats now and then. The goal is moderation.            Start small to improve your eating habits. Pay attention to portion sizes, drink less juice and soda pop, and eat more fruits and vegetables.            Eat a healthy amount of food. A 3-ounce serving of meat, for example, is about the size of a deck of cards. Fill the rest of your plate with vegetables and whole grains.            Limit the amount of soda and sports drinks you have every day. Drink more water when you are thirsty.            Eat at least 5 servings of fruits and vegetables every day. It may seem like a lot, but it is not hard to reach this goal. A serving or helping is 1 piece of fruit, 1 cup of vegetables, or 2 cups of leafy, raw vegetables. Have an apple or some carrot sticks as an afternoon snack instead of a candy bar. Try to have fruits and/or vegetables at every meal.            Make exercise part of your daily routine. You may want to start with simple activities, such as walking, bicycling, or slow swimming. Try to be active 30 to 60 minutes every day. You do not need to do all 30 to 60  minutes all at once. For example, you can exercise 3 times a day for 10 or 20 minutes. Moderate exercise is safe for most people, but it is always a good idea to talk to your doctor before starting an exercise program.            Keep moving. Mow the lawn, work in the garden, or clean your house. Take the stairs instead of the elevator at work.    Quit smoking            If you smoke, quit. People who smoke have an increased risk for heart attack, stroke, cancer, and other lung illnesses. Quitting is hard, but there are ways to boost your chance of quitting tobacco for good.            Use nicotine gum, patches, or lozenges.            Ask your doctor about stop-smoking programs and medicines.            Keep trying.  In addition to reducing your risk of diseases in the future, you will notice some benefits soon after you stop using tobacco. If you have shortness of breath or asthma symptoms, they will likely get better within a few weeks after you quit.            Limit how much alcohol you drink. Moderate amounts of alcohol (up to 2 drinks a day for men, 1 drink a day for women) are okay. But drinking too much can lead to liver problems, high blood pressure, and other health problems.    Family health  If you have a family, there are many things you can do together to improve your health.            Eat meals together as a family as often as possible.            Eat healthy foods. This includes fruits, vegetables, lean meats and dairy, and whole grains.            Include your family in your fitness plan. Most people think of activities such as jogging or tennis as the way to fitness, but there are many ways you and your family can be more active. Anything that makes you breathe hard and gets your heart pumping is exercise. Here are some tips:            Walk to do errands or to take your child to school or the bus.            Go for a family bike ride after dinner instead of watching TV.

## 2023-10-25 NOTE — PROGRESS NOTES
Sonya is a 47 year old  female who presents for annual exam.     Menses are Pt has IUD in place and  lasting  days.  Menses flow: .  No LMP recorded. (Menstrual status: IUD).. Using IUD for contraception.  She is not currently considering pregnancy.  Besides routine health maintenance, she has no other health concerns today .  Pt is due for mammogram, referral sent   Pt is due for colonoscopy, referral sent   Pt denies hot flushes, night sweats, GSM complaints   Pt returned from a trip to [place] and asking for a Malaria screen, reports being asymptomatic   GYNECOLOGIC HISTORY:  Menarche: 13  Age at first intercourse: 21 Number of lifetime partners: 6  Sonya is sexually active with 1male partner(s) and is currently in monogamous relationship.    History sexually transmitted infections:No STD history  STI testing offered?  Declined  BOBBY exposure: No  History of abnormal Pap smear: NO - age 30- 65 PAP every 3 years recommended  Family history of breast CA: No  Family history of uterine/ovarian CA: Yes (Please explain): mom    Family history of colon CA: No    HEALTH MAINTENANCE:  Cholesterol: (  Cholesterol   Date Value Ref Range Status   2021 214 (H) <200 mg/dL Final     Comment:     Desirable:       <200 mg/dl   2014 172 <200 mg/dL Final     Comment:     LDL Cholesterol is the primary guide to therapy.   The NCEP recommends further evaluation of: patients with cholesterol greater   than 200 mg/dL if additional risk factors are present, cholesterol greater   than   240 mg/dL, triglycerides greater than 150 mg/dL, or HDL less than 40 mg/dL.      History of abnormal lipids: No  Mammo: 2022 . History of abnormal Mammo: No.  Regular Self Breast Exams: Yes  Calcium/Vitamin D intake: source:  dairy, dietary supplement(s) Adequate? Yes  TSH: (  TSH   Date Value Ref Range Status   02/10/2017 1.61 0.40 - 4.00 mU/L Final    )  Pap; (  Lab Results   Component Value Date    PAP NIL 2021    PAP  "NIL 2016    PAP NIL 2012    )    HISTORY:  OB History    Para Term  AB Living   2 2 1 1 0 2   SAB IAB Ectopic Multiple Live Births   0 0 0 0 2      # Outcome Date GA Lbr Jayson/2nd Weight Sex Delivery Anes PTL Lv   2  09 36w0d  2.948 kg (6 lb 8 oz) M CS-LTranv   TRACEY      Name: Kenny Vines Term 04 41w0d  3.941 kg (8 lb 11 oz) M    TRACEY      Name: Jaspal     Past Medical History:   Diagnosis Date    Cholestasis of pregnancy (H28) 2009    Presumed secondary to sx and elevated LFTs, bile acids normal  Plan 2/week BPP and IOL 37-38 wks, no amnio--all per [facility] recc    NO ACTIVE PROBLEMS      Past Surgical History:   Procedure Laterality Date    NO HISTORY OF SURGERY       Family History   Problem Relation Age of Onset    Hypertension Mother     Cancer Mother         Uterine    Gastrointestinal Disease Mother         \"bacteria in stomach\"    Thyroid Disease Mother     Heart Disease Mother     Heart Disease Maternal Grandfather         MI    Heart Disease Paternal Grandfather         MI     Social History     Socioeconomic History    Marital status:      Spouse name: None    Number of children: 1    Years of education: None    Highest education level: None   Occupational History     Employer: [employer]   Tobacco Use    Smoking status: Never    Smokeless tobacco: Never   Vaping Use    Vaping Use: Never used   Substance and Sexual Activity    Alcohol use: No     Alcohol/week: 3.3 standard drinks of alcohol    Drug use: No    Sexual activity: Yes     Partners: Male     Birth control/protection: Natural Family Planning   Other Topics Concern    Blood Transfusions No    Occupational Exposure No    Hobby Hazards No    Parent/sibling w/ CABG, MI or angioplasty before 65F 55M? No       Current Outpatient Medications:     atovaquone-proguanil (MALARONE) 250-100 MG tablet, Take 1 tablet by mouth daily Start 2 days before exposure to Malaria and continue daily till  7 " days after exposure., Disp: 20 tablet, Rfl: 0    imiquimod (ALDARA) 5 % external cream, Apply a small sized amount to warts at bedtime.   Wash off after 8 hours., Disp: 12 packet, Rfl: 3    levonorgestrel (MIRENA) 20 MCG/24HR IUD, 1 each (20 mcg) by Intrauterine route once, Disp: , Rfl:     Multiple Vitamin (DAILY MULTIVITAMIN PO), Take  by mouth., Disp: , Rfl:     valACYclovir (VALTREX) 1000 mg tablet, Take 1 tablet (1,000 mg) by mouth daily, Disp: 30 tablet, Rfl: 0    cimetidine (TAGAMET) 400 MG tablet, Take 1 tablet (400 mg) by mouth 2 times daily (Patient not taking: Reported on 5/4/2023), Disp: 60 tablet, Rfl: 2    meloxicam (MOBIC) 15 MG tablet, Take 0.5-1 tablets (7.5-15 mg) by mouth daily as needed for pain (Patient not taking: Reported on 6/30/2023), Disp: 30 tablet, Rfl: 1    metroNIDAZOLE (METROCREAM) 0.75 % external cream, , Disp: , Rfl:     Sulfacetamide Sodium 10 % LIQD, , Disp: , Rfl:     tretinoin (RETIN-A) 0.025 % external cream, , Disp: , Rfl:     vitamin D2 (ERGOCALCIFEROL) 03695 units (1250 mcg) capsule, Take 1 capsule (50,000 Units) by mouth once a week (Patient not taking: Reported on 5/4/2023), Disp: 12 capsule, Rfl: 3     Allergies   Allergen Reactions    No Known Drug Allergy        Past medical, surgical, social and family history were reviewed and updated in EPIC.    ROS:   C:     NEGATIVE for fever, chills, change in weight  I:       NEGATIVE for worrisome rashes, moles or lesions  E:     NEGATIVE for vision changes or irritation  E/M: NEGATIVE for ear, mouth and throat problems  R:     NEGATIVE for significant cough or SOB  CV:   NEGATIVE for chest pain, palpitations or peripheral edema  GI:     NEGATIVE for nausea, abdominal pain, heartburn, or change in bowel habits  :   NEGATIVE for frequency, dysuria, hematuria, vaginal discharge, or irregular bleeding  M:     NEGATIVE for significant arthralgias or myalgia  N:      NEGATIVE for weakness, dizziness or paresthesias  E:      NEGATIVE  for temperature intolerance, skin/hair changes  P:      NEGATIVE for changes in mood or affect.    EXAM:  /71   Pulse 81   Temp 97.8  F (36.6  C)   Wt 76.7 kg (169 lb)   BMI 27.28 kg/m     BMI: Body mass index is 27.28 kg/m .  Constitutional: healthy, alert and no distress  Head: Normocephalic. No masses, lesions, tenderness or abnormalities  Neck: Neck supple. Trachea midline. No adenopathy. Thyroid symmetric, normal size.   Cardiovascular: RRR.   Respiratory: Negative.   Breast: Breasts reveal mild symmetric fibrocystic densities, but there are no dominant, discrete, fixed or suspicious masses found.  Gastrointestinal: Abdomen soft, non-tender, non-distended. No masses, organomegaly.  :  Pelvic exam deferred until next year   Musculoskeletal: extremities normal with exception of rt shoulder, pt unable to raise rt shoulder, states ongoing x 1 yr.  Has been seeing physical therapy, also given contact info for a trigger point massage therapist   Skin: no suspicious lesions or rashes  Psychiatric: Affect appropriate, cooperative,mentation appears normal.     COUNSELING:   Reviewed preventive health counseling, as reflected in patient instructions       Regular exercise       Healthy diet/nutrition       Colorectal Cancer Screening   reports that she has never smoked. She has never used smokeless tobacco.    Body mass index is 27.28 kg/m .    FRAX Risk Assessment    ASSESSMENT:  47 year old female with satisfactory annual exam  (Z13.29) Screening for thyroid disorder  (primary encounter diagnosis)  Comment:   Plan: TSH with free T4 reflex            (Z13.220) Screening for cholesterol level  Comment:   Plan: Cholesterol, HDL cholesterol            (Z13.1) Screening for diabetes mellitus  Comment:   Plan: HEMOGLOBIN A1C - Z13.1            (Z01.419) Well woman exam with routine gynecological exam  Comment:   Plan:     (Z78.9) H/O foreign travel  Comment:   Plan: Parasite stain            (Z13.0) Screening for  iron deficiency anemia  Comment:   Plan: CBC with platelets            (Z97.5) IUD (intrauterine device) in place  Comment:   Plan:     (Z12.31) Encounter for screening mammogram for malignant neoplasm of breast  Comment:   Plan: *MA Screening Digital Bilateral            (Z12.11) Screening for colon cancer  Comment:   Plan: Colonoscopy Screening  Referral    RTC 1 yr for pap and IUD check.  Encouraged colonoscopy and mammogram.  Pt is seeing PT for her frozen shoulder, also given contact info for a trigger point massage therapist     DAVID Kowalski CNM       Answers submitted by the patient for this visit:  Annual Preventive Visit (Submitted on 10/25/2023)  Chief Complaint: Annual Exam:  Frequency of exercise:: 2-3 days/week  Getting at least 3 servings of Calcium per day:: Yes  Diet:: Regular (no restrictions)  Taking medications regularly:: Yes  Medication side effects:: None  Bi-annual eye exam:: Yes  Dental care twice a year:: Yes  Sleep apnea or symptoms of sleep apnea:: None  abdominal pain: No  Blood in stool: No  Blood in urine: No  chest pain: No  chills: No  congestion: No  constipation: No  cough: No  diarrhea: No  dizziness: No  ear pain: No  eye pain: No  nervous/anxious: No  Additional concerns today:: No  Exercise outside of work (Submitted on 10/25/2023)  Chief Complaint: Annual Exam:  Duration of exercise:: 15-30 minutes

## 2023-11-09 ENCOUNTER — TELEPHONE (OUTPATIENT)
Dept: GASTROENTEROLOGY | Facility: CLINIC | Age: 48
End: 2023-11-09
Payer: COMMERCIAL

## 2023-11-09 NOTE — TELEPHONE ENCOUNTER
"Endoscopy Scheduling Screen    Have you had a positive Covid test in the last 14 days?  No    Are you active on MyChart?   Yes    What insurance is in the chart?  Other:  [employer]    Ordering/Referring Provider: GABRIELLE MAK    (If ordering provider performs procedure, schedule with ordering provider unless otherwise instructed. )    BMI: Estimated body mass index is 27.28 kg/m  as calculated from the following:    Height as of 2/1/23: 1.676 m (5' 6\").    Weight as of 10/25/23: 76.7 kg (169 lb).     Sedation Ordered  moderate sedation.   If patient BMI > 50 do not schedule in [facility].    If patient BMI > 45 do not schedule at [facility].    Are you taking methadone or Suboxone?  No    Are you taking any prescription medications for pain 3 or more times per week?   No    Do you have a history of malignant hyperthermia or adverse reaction to anesthesia?  No    (Females) Are you currently pregnant?   No     Have you been diagnosed or told you have pulmonary hypertension?   No    Do you have an LVAD?  No    Have you been told you have moderate to severe sleep apnea?  No    Have you been told you have COPD, asthma, or any other lung disease?  No    Do you have any heart conditions?  No     Have you ever had an organ transplant?   No    Have you ever had or are you awaiting a heart or lung transplant?   No    Have you had a stroke or transient ischemic attack (TIA aka \"mini stroke\" in the last 6 months?   No    Have you been diagnosed with or been told you have cirrhosis of the liver?   No    Are you currently on dialysis?   No    Do you need assistance transferring?   No    BMI: Estimated body mass index is 27.28 kg/m  as calculated from the following:    Height as of 2/1/23: 1.676 m (5' 6\").    Weight as of 10/25/23: 76.7 kg (169 lb).     Is patients BMI > 40 and scheduling location [facility]?  No    Do you take an injectable medication for weight loss or diabetes (excluding insulin)?  No    Do you take the medication " Naltrexone?  No    Do you take blood thinners?  No       Prep   Are you currently on dialysis or do you have chronic kidney disease?  No    Do you have a diagnosis of diabetes?  No    Do you have a diagnosis of cystic fibrosis (CF)?  No    On a regular basis do you go 3 -5 days between bowel movements?  No    BMI > 40?  No    Preferred Pharmacy:        [id number] - [place], [place] - [address]  [address] [facility] [address]  Phone: [phone] Fax: [phone]      Final Scheduling Details   Colonoscopy prep sent?  Standard MiraLAX    Procedure scheduled  Colonoscopy    Surgeon:  yasmine     Date of procedure:  2/7/24     Pre-OP / PAC:   No - Not required for this site.    Location  [facility] - Patient preference.    Sedation   MAC/Deep Sedation  location      Patient Reminders:   You will receive a call from a Nurse to review instructions and health history.  This assessment must be completed prior to your procedure.  Failure to complete the Nurse assessment may result in the procedure being cancelled.      On the day of your procedure, please designate an adult(s) who can drive you home stay with you for the next 24 hours. The medicines used in the exam will make you sleepy. You will not be able to drive.      You cannot take public transportation, ride share services, or non-medical taxi service without a responsible caregiver.  Medical transport services are allowed with the requirement that a responsible caregiver will receive you at your destination.  We require that drivers and caregivers are confirmed prior to your procedure.

## 2023-12-15 ENCOUNTER — ANCILLARY PROCEDURE (OUTPATIENT)
Dept: MAMMOGRAPHY | Facility: CLINIC | Age: 48
End: 2023-12-15
Attending: ADVANCED PRACTICE MIDWIFE
Payer: COMMERCIAL

## 2023-12-15 DIAGNOSIS — Z12.31 ENCOUNTER FOR SCREENING MAMMOGRAM FOR MALIGNANT NEOPLASM OF BREAST: ICD-10-CM

## 2023-12-15 PROCEDURE — 77067 SCR MAMMO BI INCL CAD: CPT | Mod: TC | Performed by: RADIOLOGY

## 2024-01-12 ENCOUNTER — ALLIED HEALTH/NURSE VISIT (OUTPATIENT)
Dept: FAMILY MEDICINE | Facility: CLINIC | Age: 49
End: 2024-01-12
Payer: COMMERCIAL

## 2024-01-12 DIAGNOSIS — Z23 NEED FOR VACCINATION: Primary | ICD-10-CM

## 2024-01-12 PROCEDURE — 99207 PR NO CHARGE NURSE ONLY: CPT

## 2024-01-12 PROCEDURE — 90471 IMMUNIZATION ADMIN: CPT

## 2024-01-12 PROCEDURE — 90636 HEP A/HEP B VACC ADULT IM: CPT

## 2024-02-06 ENCOUNTER — ANESTHESIA EVENT (OUTPATIENT)
Dept: SURGERY | Facility: AMBULATORY SURGERY CENTER | Age: 49
End: 2024-02-06
Payer: COMMERCIAL

## 2024-02-07 ENCOUNTER — ANESTHESIA (OUTPATIENT)
Dept: SURGERY | Facility: AMBULATORY SURGERY CENTER | Age: 49
End: 2024-02-07
Payer: COMMERCIAL

## 2024-02-07 ENCOUNTER — HOSPITAL ENCOUNTER (OUTPATIENT)
Facility: AMBULATORY SURGERY CENTER | Age: 49
Discharge: HOME | End: 2024-02-07
Attending: SURGERY
Payer: COMMERCIAL

## 2024-02-07 VITALS
TEMPERATURE: 97.2 F | BODY MASS INDEX: 27.28 KG/M2 | OXYGEN SATURATION: 100 % | DIASTOLIC BLOOD PRESSURE: 54 MMHG | SYSTOLIC BLOOD PRESSURE: 93 MMHG | WEIGHT: 169 LBS | RESPIRATION RATE: 16 BRPM | HEART RATE: 66 BPM

## 2024-02-07 DIAGNOSIS — Z12.11 SCREEN FOR COLON CANCER: ICD-10-CM

## 2024-02-07 LAB — COLONOSCOPY: NORMAL

## 2024-02-07 RX ORDER — ONDANSETRON 2 MG/ML
INJECTION INTRAMUSCULAR; INTRAVENOUS PRN
Status: DISCONTINUED | OUTPATIENT
Start: 2024-02-07 | End: 2024-02-07

## 2024-02-07 RX ORDER — LIDOCAINE HYDROCHLORIDE 20 MG/ML
INJECTION, SOLUTION INFILTRATION; PERINEURAL PRN
Status: DISCONTINUED | OUTPATIENT
Start: 2024-02-07 | End: 2024-02-07

## 2024-02-07 RX ORDER — SODIUM CHLORIDE, SODIUM LACTATE, POTASSIUM CHLORIDE, CALCIUM CHLORIDE 600; 310; 30; 20 MG/100ML; MG/100ML; MG/100ML; MG/100ML
INJECTION, SOLUTION INTRAVENOUS CONTINUOUS
Status: DISCONTINUED | OUTPATIENT
Start: 2024-02-07 | End: 2024-02-08 | Stop reason: HOSPADM

## 2024-02-07 RX ORDER — ONDANSETRON 4 MG/1
4 TABLET, ORALLY DISINTEGRATING ORAL EVERY 30 MIN PRN
Status: DISCONTINUED | OUTPATIENT
Start: 2024-02-07 | End: 2024-02-08 | Stop reason: HOSPADM

## 2024-02-07 RX ORDER — GLYCOPYRROLATE 0.2 MG/ML
INJECTION, SOLUTION INTRAMUSCULAR; INTRAVENOUS PRN
Status: DISCONTINUED | OUTPATIENT
Start: 2024-02-07 | End: 2024-02-07

## 2024-02-07 RX ORDER — PROPOFOL 10 MG/ML
INJECTION, EMULSION INTRAVENOUS PRN
Status: DISCONTINUED | OUTPATIENT
Start: 2024-02-07 | End: 2024-02-07

## 2024-02-07 RX ORDER — LIDOCAINE 40 MG/G
CREAM TOPICAL
Status: DISCONTINUED | OUTPATIENT
Start: 2024-02-07 | End: 2024-02-08 | Stop reason: HOSPADM

## 2024-02-07 RX ORDER — ONDANSETRON 2 MG/ML
4 INJECTION INTRAMUSCULAR; INTRAVENOUS EVERY 30 MIN PRN
Status: DISCONTINUED | OUTPATIENT
Start: 2024-02-07 | End: 2024-02-08 | Stop reason: HOSPADM

## 2024-02-07 RX ORDER — PROPOFOL 10 MG/ML
INJECTION, EMULSION INTRAVENOUS CONTINUOUS PRN
Status: DISCONTINUED | OUTPATIENT
Start: 2024-02-07 | End: 2024-02-07

## 2024-02-07 RX ADMIN — SODIUM CHLORIDE, SODIUM LACTATE, POTASSIUM CHLORIDE, CALCIUM CHLORIDE: 600; 310; 30; 20 INJECTION, SOLUTION INTRAVENOUS at 08:30

## 2024-02-07 RX ADMIN — PROPOFOL 40 MG: 10 INJECTION, EMULSION INTRAVENOUS at 08:41

## 2024-02-07 RX ADMIN — GLYCOPYRROLATE 0.2 MG: 0.2 INJECTION, SOLUTION INTRAMUSCULAR; INTRAVENOUS at 08:41

## 2024-02-07 RX ADMIN — PROPOFOL 20 MG: 10 INJECTION, EMULSION INTRAVENOUS at 08:57

## 2024-02-07 RX ADMIN — PROPOFOL 200 MCG/KG/MIN: 10 INJECTION, EMULSION INTRAVENOUS at 08:41

## 2024-02-07 RX ADMIN — PROPOFOL 40 MG: 10 INJECTION, EMULSION INTRAVENOUS at 08:48

## 2024-02-07 RX ADMIN — ONDANSETRON 4 MG: 2 INJECTION INTRAMUSCULAR; INTRAVENOUS at 08:59

## 2024-02-07 RX ADMIN — LIDOCAINE HYDROCHLORIDE 3 ML: 20 INJECTION, SOLUTION INFILTRATION; PERINEURAL at 08:41

## 2024-02-07 NOTE — ANESTHESIA CARE TRANSFER NOTE
Patient: Sonya Blackmon    Procedure: Procedure(s):  COLONOSCOPY with Polypectomy       Diagnosis: Screen for colon cancer [Z12.11]  Diagnosis Additional Information: No value filed.    Anesthesia Type:   MAC     Note:    Oropharynx: oropharynx clear of all foreign objects and spontaneously breathing  Level of Consciousness: drowsy  Oxygen Supplementation: room air    Independent Airway: airway patency satisfactory and stable  Dentition: dentition unchanged  Vital Signs Stable: post-procedure vital signs reviewed and stable  Report to RN Given: handoff report given  Patient transferred to: Phase II    Handoff Report: Identifed the Patient, Identified the Reponsible Provider, Reviewed the pertinent medical history, Discussed the surgical course, Reviewed Intra-OP anesthesia mangement and issues during anesthesia, Set expectations for post-procedure period and Allowed opportunity for questions and acknowledgement of understanding      Vitals:  Vitals Value Taken Time   BP 93/53 02/07/24 0920   Temp 96.8  F (36  C) 02/07/24 0920   Pulse 76 02/07/24 0920   Resp 16 02/07/24 0920   SpO2 99 % 02/07/24 0920       Electronically Signed By: DAVID Muñoz CRNA  February 7, 2024  9:23 AM

## 2024-02-07 NOTE — ANESTHESIA POSTPROCEDURE EVALUATION
Patient: Sonya Blackmon    Procedure: Procedure(s):  COLONOSCOPY with Polypectomy       Anesthesia Type:  MAC    Note:  Disposition: Outpatient   Postop Pain Control: Uneventful            Sign Out: Well controlled pain   PONV: No   Neuro/Psych: Uneventful            Sign Out: Acceptable/Baseline neuro status   Airway/Respiratory: Uneventful            Sign Out: Acceptable/Baseline resp. status   CV/Hemodynamics: Uneventful            Sign Out: Acceptable CV status; No obvious hypovolemia; No obvious fluid overload   Other NRE: NONE   DID A NON-ROUTINE EVENT OCCUR? No           Last vitals:  Vitals Value Taken Time   BP 93/54 02/07/24 1015   Temp 97.2  F (36.2  C) 02/07/24 1015   Pulse 69 02/07/24 1017   Resp 16 02/07/24 0944   SpO2 100 % 02/07/24 1017   Vitals shown include unfiled device data.    Electronically Signed By: David Maynard MD  February 7, 2024  10:51 AM

## 2024-02-07 NOTE — H&P
PRE PROCEDURE NOTE - H & P      Chief Complaint/Reason for Procedure:              screening colonoscopy    Current Outpatient Medications   Medication    atovaquone-proguanil (MALARONE) 250-100 MG tablet    imiquimod (ALDARA) 5 % external cream    levonorgestrel (MIRENA) 20 MCG/24HR IUD    Multiple Vitamin (DAILY MULTIVITAMIN PO)    valACYclovir (VALTREX) 1000 mg tablet     Current Facility-Administered Medications   Medication    lactated ringers infusion    lidocaine (LMX4) kit    lidocaine 1 % 0.1-1 mL    sodium chloride (PF) 0.9% PF flush 3 mL    sodium chloride (PF) 0.9% PF flush 3 mL          Allergies   Allergen Reactions    No Known Drug Allergy        Past Medical History:   Diagnosis Date    Cholestasis of pregnancy (H28) 08/07/2009    Presumed secondary to sx and elevated LFTs, bile acids normal  Plan 2/week BPP and IOL 37-38 wks, no amnio--all per [facility] recc    NO ACTIVE PROBLEMS        Past Surgical History:   Procedure Laterality Date    NO HISTORY OF SURGERY         Pre-sedation assessment:            /77   Pulse 82   Temp 97.7  F (36.5  C) (Temporal)   Resp 16   Wt 76.7 kg (169 lb)   SpO2 96%   BMI 27.28 kg/m    General appearance: alert, appears stated age, and cooperative  Airway: No gross abnormalities appreciated that would increase risk of airway compromise.   Heart: Regular rate and rhythm  Lungs: Normal respiratory effort    ASA Classification: 1    Sedation Plan based on assessment: Moderate    Impression:  Patient deemed adequate candidate for moderate sedation         Plan:   colonoscopy      Jerrell Nergo MD  2/7/2024 8:29 AM  [phone]

## 2024-02-07 NOTE — DISCHARGE INSTRUCTIONS
If you have any questions or concerns regarding your procedure, please contact Dr. Negro, his office number is [phone].   Discharge Instructions:    Take you medications as directed and follow up with you primary provider as scheduled.   You should expect to pass air from your rectum after the procedure.   Follow these care guidelines during your recovery for the next 24 hours.   If you have any questions or concerns please contact the provider that performed your procedure.     You were given medicine for sedation:  You have been given medicines during your procedure that might make you sleepy and weak. To prevent problems:    *Rest for the rest of the day after you are home. You should be back to your normal activity tomorrow.  *For the next 24 hours:   -Do not drink alcoholic beverages.   -Do not make any important decisions or sign any legal forms.   -Do not work around machinery or power equipment.     The medicines used for sedation may make you feel nauseated.   *Start with clear liquids, such as tea, jell-o, broth and ginger ale. As you feel better you may add soft foods such as pudding and ice cream.   *When you no longer feel nauseated, you may try your normal diet.     You should be back to eating your normal after 24 hours.     Call if you have any of these problems:  *Fever of 101 degree F or 38 degrees C  *Bleeding from the rectum  *Black stool or blood in your bowel movements  *Nausea with vomiting that does not ease after a few hours.  *Abdominal pain or bloating  *Fainting

## 2024-02-07 NOTE — ANESTHESIA PREPROCEDURE EVALUATION
Anesthesia Pre-Procedure Evaluation    Patient: Sonya Blackmon   MRN: [id number] : 1975        Procedure : Procedure(s):  COLONOSCOPY          Past Medical History:   Diagnosis Date    Cholestasis of pregnancy (H28) 2009    Presumed secondary to sx and elevated LFTs, bile acids normal  Plan 2/week BPP and IOL 37-38 wks, no amnio--all per [facility] recc    NO ACTIVE PROBLEMS       Past Surgical History:   Procedure Laterality Date    NO HISTORY OF SURGERY        Allergies   Allergen Reactions    No Known Drug Allergy       Social History     Tobacco Use    Smoking status: Never    Smokeless tobacco: Never   Substance Use Topics    Alcohol use: No     Alcohol/week: 3.3 standard drinks of alcohol      Wt Readings from Last 1 Encounters:   24 76.7 kg (169 lb)        Anesthesia Evaluation   Pt has had prior anesthetic.     No history of anesthetic complications       ROS/MED HX  ENT/Pulmonary:  - neg pulmonary ROS     Neurologic:  - neg neurologic ROS     Cardiovascular:  - neg cardiovascular ROS     METS/Exercise Tolerance:     Hematologic:  - neg hematologic  ROS     Musculoskeletal:  - neg musculoskeletal ROS     GI/Hepatic: Comment: Blood in stool       Renal/Genitourinary:  - neg Renal ROS     Endo:  - neg endo ROS     Psychiatric/Substance Use:  - neg psychiatric ROS     Infectious Disease:  - neg infectious disease ROS     Malignancy:  - neg malignancy ROS     Other:  - neg other ROS          Physical Exam    Airway  airway exam normal      Mallampati: II   TM distance: > 3 FB   Neck ROM: full   Mouth opening: > 3 cm    Respiratory Devices and Support         Dental  no notable dental history     (+) Minor Abnormalities - some fillings, tiny chips      Cardiovascular   cardiovascular exam normal       Rhythm and rate: regular and normal     Pulmonary   pulmonary exam normal        breath sounds clear to auscultation           OUTSIDE LABS:  CBC:   Lab Results   Component Value Date    WBC  6.1 10/25/2023    WBC 8.4 12/05/2022    HGB 12.9 10/25/2023    HGB 12.5 12/05/2022    HCT 39.1 10/25/2023    HCT 38.2 12/05/2022     10/25/2023     12/05/2022     BMP:   Lab Results   Component Value Date     12/05/2022     05/25/2021    POTASSIUM 3.9 12/05/2022    POTASSIUM 4.0 05/25/2021    CHLORIDE 104 12/05/2022    CHLORIDE 107 05/25/2021    CO2 22 12/05/2022    CO2 25 05/25/2021    BUN 17.2 12/05/2022    BUN 13 05/25/2021    CR 0.93 12/05/2022    CR 0.81 05/25/2021    GLC 88 12/05/2022    GLC 85 05/25/2021     COAGS:   Lab Results   Component Value Date    INR 0.93 08/26/2009     POC:   Lab Results   Component Value Date    HCG Negative 09/02/2021     HEPATIC:   Lab Results   Component Value Date    ALBUMIN 3.8 05/25/2021    PROTTOTAL 8.0 05/25/2021    ALT 25 05/25/2021    AST 11 05/25/2021    ALKPHOS 82 05/25/2021    BILITOTAL 0.6 05/25/2021     OTHER:   Lab Results   Component Value Date    A1C 5.5 10/25/2023    YELENA 9.0 12/05/2022    TSH 1.95 10/25/2023    T4 1.06 02/10/2017       Anesthesia Plan    ASA Status:  2    NPO Status:  NPO Appropriate    Anesthesia Type: MAC.     - Reason for MAC: straight local not clinically adequate              Consents    Anesthesia Plan(s) and associated risks, benefits, and realistic alternatives discussed. Questions answered and patient/representative(s) expressed understanding.     - Discussed:     - Discussed with:  Patient            Postoperative Care    Pain management: IV analgesics, Oral pain medications, Multi-modal analgesia.   PONV prophylaxis: Ondansetron (or other 5HT-3), Dexamethasone or Solumedrol, Droperidol or Haldol     Comments:               David Maynard MD    I have reviewed the pertinent notes and labs in the chart from the past 30 days and (re)examined the patient.  Any updates or changes from those notes are reflected in this note.

## 2024-05-17 ENCOUNTER — VIRTUAL VISIT (OUTPATIENT)
Dept: FAMILY MEDICINE | Facility: CLINIC | Age: 49
End: 2024-05-17
Payer: COMMERCIAL

## 2024-05-17 DIAGNOSIS — J01.10 SUBACUTE FRONTAL SINUSITIS: Primary | ICD-10-CM

## 2024-05-17 DIAGNOSIS — R05.2 SUBACUTE COUGH: ICD-10-CM

## 2024-05-17 PROCEDURE — 99213 OFFICE O/P EST LOW 20 MIN: CPT | Mod: 95 | Performed by: FAMILY MEDICINE

## 2024-05-17 RX ORDER — AZITHROMYCIN 250 MG/1
TABLET, FILM COATED ORAL
Qty: 6 TABLET | Refills: 0 | Status: SHIPPED | OUTPATIENT
Start: 2024-05-17 | End: 2024-05-22

## 2024-05-17 RX ORDER — PREDNISONE 20 MG/1
TABLET ORAL
Qty: 10 TABLET | Refills: 0 | Status: SHIPPED | OUTPATIENT
Start: 2024-05-17

## 2024-05-17 ASSESSMENT — PATIENT HEALTH QUESTIONNAIRE - PHQ9: SUM OF ALL RESPONSES TO PHQ QUESTIONS 1-9: 0

## 2024-05-17 NOTE — PROGRESS NOTES
Soledad is a 48 year old who is being evaluated via a billable video visit.    How would you like to obtain your AVS? MyChart  If the video visit is dropped, the invitation should be resent by: Text to cell phone: [phone]  Will anyone else be joining your video visit? No      Assessment & Plan     Subacute frontal sinusitis  Continue with Flonase  - azithromycin (ZITHROMAX) 250 MG tablet; Take 2 tablets (500 mg) by mouth daily for 1 day, THEN 1 tablet (250 mg) daily for 4 days.  - predniSONE (DELTASONE) 20 MG tablet; 2 tab daily for 5 days    Subacute cough    - predniSONE (DELTASONE) 20 MG tablet; 2 tab daily for 5 days        Subjective   Soledad is a 48 year old, presenting for the following health issues:  Cough  Patient reports she has been sick since March,she was in [place] at that time she got sick with influenza  type of symptoms with fever and chills, cough,.  She felt better.  However, for the past 2 weeks she has been having more sinus congestion, postnasal drainage, more cough.    No history of asthma or smoking.  No shortness of breath, no chest pain, no wheezing.  No headache, no fever no chills.  No diarrhea no vomiting.        5/17/2024     7:26 AM   Additional Questions   Roomed by noreen arnett     Acute Illness  Acute illness concerns: cough  Onset/Duration: March  Symptoms:  Fever: No  Chills/Sweats: No  Headache (location?): YES  Sinus Pressure: YES  Conjunctivitis:  No  Ear Pain: no  Rhinorrhea: YES  Congestion: YES-some  Sore Throat: No  Cough: YES-productive of clear sputum sometimes more discolored and chest tightness  Wheeze: No  Decreased Appetite: No  Nausea: No  Vomiting: No  Diarrhea: No  Dysuria/Freq.: No  Dysuria or Hematuria: No  Fatigue/Achiness: No  Sick/Strep Exposure: No  Therapies tried and outcome: Allergy meds; not effective with cough        Review of Systems  Constitutional, HEENT, cardiovascular, pulmonary, GI, , musculoskeletal, neuro, skin, endocrine and psych  systems are negative, except as otherwise noted.      Objective           Vitals:  No vitals were obtained today due to virtual visit.    Physical Exam   GENERAL: alert and no distress  EYES: Eyes grossly normal to inspection.  No discharge or erythema, or obvious scleral/conjunctival abnormalities.  RESP: No audible wheeze, cough, or visible cyanosis.    SKIN: Visible skin clear. No significant rash, abnormal pigmentation or lesions.  NEURO: Cranial nerves grossly intact.  Mentation and speech appropriate for age.  PSYCH: Appropriate affect, tone, and pace of words        Video-Visit Details    Type of service:  Video Visit   Originating Location (pt. Location): Home    Distant Location (provider location):  On-site  Platform used for Video Visit: Ag[employer]  Signed Electronically by: Dennis Cruz MD

## 2024-09-25 ENCOUNTER — PATIENT OUTREACH (OUTPATIENT)
Dept: CARE COORDINATION | Facility: CLINIC | Age: 49
End: 2024-09-25
Payer: COMMERCIAL

## 2024-10-09 ENCOUNTER — PATIENT OUTREACH (OUTPATIENT)
Dept: CARE COORDINATION | Facility: CLINIC | Age: 49
End: 2024-10-09
Payer: COMMERCIAL

## 2024-12-29 ENCOUNTER — HEALTH MAINTENANCE LETTER (OUTPATIENT)
Age: 49
End: 2024-12-29

## 2025-04-23 ENCOUNTER — PATIENT OUTREACH (OUTPATIENT)
Dept: CARE COORDINATION | Facility: CLINIC | Age: 50
End: 2025-04-23
Payer: COMMERCIAL

## 2025-05-28 ENCOUNTER — E-VISIT (OUTPATIENT)
Dept: URGENT CARE | Facility: CLINIC | Age: 50
End: 2025-05-28
Payer: COMMERCIAL
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
## 2025-05-28 PROCEDURE — 99207 PR NON-BILLABLE SERV PER CHARTING: CPT | Performed by: NURSE PRACTITIONER

## 2025-05-28 NOTE — PATIENT INSTRUCTIONS
Dear Sonya Blackmon,    We are sorry you are not feeling well. Based on the responses you provided, it is recommended that you be seen in-person in urgent care so we can better evaluate your symptoms. Please click here to find the nearest urgent care location to you.   You will not be charged for this Visit. Thank you for trusting us with your care.    DAVID FOREMAN CNP

## 2025-08-13 ENCOUNTER — E-VISIT (OUTPATIENT)
Dept: URGENT CARE | Facility: CLINIC | Age: 50
End: 2025-08-13
Payer: COMMERCIAL

## 2025-08-13 DIAGNOSIS — R19.7 DIARRHEA, UNSPECIFIED TYPE: Primary | ICD-10-CM
